# Patient Record
Sex: MALE | Race: WHITE | NOT HISPANIC OR LATINO | Employment: FULL TIME | URBAN - METROPOLITAN AREA
[De-identification: names, ages, dates, MRNs, and addresses within clinical notes are randomized per-mention and may not be internally consistent; named-entity substitution may affect disease eponyms.]

---

## 2017-05-05 RX ORDER — SILDENAFIL 100 MG/1
100 TABLET, FILM COATED ORAL PRN
Qty: 12 TABLET | Refills: 3 | Status: SHIPPED | OUTPATIENT
Start: 2017-05-05 | End: 2018-01-08 | Stop reason: SDUPTHER

## 2017-05-05 RX ORDER — ALLOPURINOL 100 MG/1
100 TABLET ORAL DAILY
Qty: 90 TABLET | Refills: 1 | Status: SHIPPED | OUTPATIENT
Start: 2017-05-05 | End: 2017-12-26 | Stop reason: SDUPTHER

## 2017-08-17 DIAGNOSIS — F41.9 ANXIETY: ICD-10-CM

## 2017-08-17 DIAGNOSIS — F51.01 PRIMARY INSOMNIA: ICD-10-CM

## 2017-08-17 RX ORDER — ALPRAZOLAM 0.5 MG/1
0.5 TABLET ORAL 3 TIMES DAILY PRN
Qty: 45 TABLET | Refills: 1 | Status: SHIPPED
Start: 2017-08-17 | End: 2018-01-08 | Stop reason: SDUPTHER

## 2017-08-17 RX ORDER — ZOLPIDEM TARTRATE 10 MG/1
10 TABLET ORAL NIGHTLY PRN
Qty: 30 TABLET | Refills: 1 | Status: SHIPPED
Start: 2017-08-17 | End: 2018-01-08 | Stop reason: SDUPTHER

## 2017-12-07 RX ORDER — ALLOPURINOL 100 MG/1
100 TABLET ORAL DAILY
Qty: 90 TABLET | Refills: 0 | OUTPATIENT
Start: 2017-12-07

## 2017-12-26 ENCOUNTER — TELEPHONE (OUTPATIENT)
Dept: FAMILY MEDICINE | Age: 37
End: 2017-12-26

## 2017-12-26 RX ORDER — ALLOPURINOL 100 MG/1
100 TABLET ORAL DAILY
Qty: 30 TABLET | Refills: 0 | Status: SHIPPED | OUTPATIENT
Start: 2017-12-26 | End: 2018-01-10 | Stop reason: DRUGHIGH

## 2018-01-08 ENCOUNTER — OFFICE VISIT (OUTPATIENT)
Dept: FAMILY MEDICINE | Age: 38
End: 2018-01-08

## 2018-01-08 ENCOUNTER — LAB SERVICES (OUTPATIENT)
Dept: LAB | Age: 38
End: 2018-01-08

## 2018-01-08 DIAGNOSIS — M1A.0790 IDIOPATHIC CHRONIC GOUT OF ANKLE WITHOUT TOPHUS, UNSPECIFIED LATERALITY: Primary | ICD-10-CM

## 2018-01-08 DIAGNOSIS — F51.01 PRIMARY INSOMNIA: ICD-10-CM

## 2018-01-08 DIAGNOSIS — I10 ESSENTIAL (PRIMARY) HYPERTENSION: ICD-10-CM

## 2018-01-08 DIAGNOSIS — N52.9 ERECTILE DYSFUNCTION, UNSPECIFIED ERECTILE DYSFUNCTION TYPE: ICD-10-CM

## 2018-01-08 DIAGNOSIS — M1A.0790 IDIOPATHIC CHRONIC GOUT OF ANKLE WITHOUT TOPHUS, UNSPECIFIED LATERALITY: ICD-10-CM

## 2018-01-08 DIAGNOSIS — F41.9 ANXIETY: ICD-10-CM

## 2018-01-08 PROCEDURE — 36415 COLL VENOUS BLD VENIPUNCTURE: CPT | Performed by: INTERNAL MEDICINE

## 2018-01-08 PROCEDURE — 84550 ASSAY OF BLOOD/URIC ACID: CPT | Performed by: INTERNAL MEDICINE

## 2018-01-08 PROCEDURE — 80048 BASIC METABOLIC PNL TOTAL CA: CPT | Performed by: INTERNAL MEDICINE

## 2018-01-08 PROCEDURE — 99214 OFFICE O/P EST MOD 30 MIN: CPT | Performed by: FAMILY MEDICINE

## 2018-01-08 RX ORDER — ZOLPIDEM TARTRATE 10 MG/1
10 TABLET ORAL NIGHTLY PRN
Qty: 30 TABLET | Refills: 1 | Status: SHIPPED | OUTPATIENT
Start: 2018-01-08 | End: 2018-07-20 | Stop reason: SDUPTHER

## 2018-01-08 RX ORDER — ALPRAZOLAM 0.5 MG/1
0.5 TABLET ORAL 3 TIMES DAILY PRN
Qty: 45 TABLET | Refills: 1 | Status: SHIPPED | OUTPATIENT
Start: 2018-01-08 | End: 2018-07-20 | Stop reason: SDUPTHER

## 2018-01-08 RX ORDER — SILDENAFIL 100 MG/1
100 TABLET, FILM COATED ORAL PRN
Qty: 12 TABLET | Refills: 3 | Status: SHIPPED | OUTPATIENT
Start: 2018-01-08 | End: 2018-01-23 | Stop reason: SDUPTHER

## 2018-01-08 ASSESSMENT — PATIENT HEALTH QUESTIONNAIRE - PHQ9
CLINICAL INTERPRETATION OF PHQ2 SCORE: 2
SUM OF ALL RESPONSES TO PHQ9 QUESTIONS 1 AND 2: 2

## 2018-01-08 ASSESSMENT — PAIN SCALES - GENERAL: PAINLEVEL: 0

## 2018-01-09 LAB
ANION GAP SERPL CALC-SCNC: 14 MMOL/L (ref 10–20)
BUN SERPL-MCNC: 12 MG/DL (ref 6–20)
BUN/CREAT SERPL: 13 (ref 7–25)
CALCIUM SERPL-MCNC: 9.9 MG/DL (ref 8.4–10.2)
CHLORIDE SERPL-SCNC: 102 MMOL/L (ref 98–107)
CO2 SERPL-SCNC: 25 MMOL/L (ref 21–32)
CREAT SERPL-MCNC: 0.94 MG/DL (ref 0.67–1.17)
FASTING STATUS PATIENT QL REPORTED: 0 HRS
GLUCOSE SERPL-MCNC: 85 MG/DL (ref 65–99)
POTASSIUM SERPL-SCNC: 4 MMOL/L (ref 3.4–5.1)
SODIUM SERPL-SCNC: 137 MMOL/L (ref 135–145)
URATE SERPL-MCNC: 7.7 MG/DL (ref 3.5–7.2)

## 2018-01-10 ENCOUNTER — E-ADVICE (OUTPATIENT)
Dept: FAMILY MEDICINE | Age: 38
End: 2018-01-10

## 2018-01-10 RX ORDER — ALLOPURINOL 300 MG/1
300 TABLET ORAL DAILY
Qty: 90 TABLET | Refills: 3 | Status: SHIPPED | OUTPATIENT
Start: 2018-01-10 | End: 2018-07-20 | Stop reason: SDUPTHER

## 2018-01-13 DIAGNOSIS — N52.9 ERECTILE DYSFUNCTION, UNSPECIFIED ERECTILE DYSFUNCTION TYPE: ICD-10-CM

## 2018-01-15 RX ORDER — SILDENAFIL 100 MG/1
100 TABLET, FILM COATED ORAL PRN
Qty: 12 TABLET | Refills: 3 | OUTPATIENT
Start: 2018-01-15

## 2018-01-23 DIAGNOSIS — N52.9 ERECTILE DYSFUNCTION, UNSPECIFIED ERECTILE DYSFUNCTION TYPE: ICD-10-CM

## 2018-01-23 RX ORDER — SILDENAFIL CITRATE 20 MG/1
TABLET ORAL
Qty: 60 TABLET | Refills: 0 | Status: SHIPPED | OUTPATIENT
Start: 2018-01-23

## 2018-01-23 RX ORDER — SILDENAFIL 100 MG/1
100 TABLET, FILM COATED ORAL PRN
Qty: 12 TABLET | Refills: 3 | Status: CANCELLED | OUTPATIENT
Start: 2018-01-23

## 2018-01-25 ENCOUNTER — TELEPHONE (OUTPATIENT)
Dept: FAMILY MEDICINE | Age: 38
End: 2018-01-25

## 2018-02-02 ENCOUNTER — OFFICE VISIT (OUTPATIENT)
Dept: FAMILY MEDICINE | Age: 38
End: 2018-02-02

## 2018-02-02 VITALS
SYSTOLIC BLOOD PRESSURE: 143 MMHG | HEIGHT: 76 IN | DIASTOLIC BLOOD PRESSURE: 91 MMHG | WEIGHT: 214.6 LBS | RESPIRATION RATE: 20 BRPM | HEART RATE: 88 BPM | TEMPERATURE: 98.5 F | BODY MASS INDEX: 26.13 KG/M2

## 2018-02-02 DIAGNOSIS — R51.9 NONINTRACTABLE HEADACHE, UNSPECIFIED CHRONICITY PATTERN, UNSPECIFIED HEADACHE TYPE: Primary | ICD-10-CM

## 2018-02-02 DIAGNOSIS — Z23 NEED FOR VACCINATION: ICD-10-CM

## 2018-02-02 DIAGNOSIS — I10 ESSENTIAL (PRIMARY) HYPERTENSION: ICD-10-CM

## 2018-02-02 PROCEDURE — 90471 IMMUNIZATION ADMIN: CPT | Performed by: FAMILY MEDICINE

## 2018-02-02 PROCEDURE — 99214 OFFICE O/P EST MOD 30 MIN: CPT | Performed by: FAMILY MEDICINE

## 2018-02-02 PROCEDURE — 90688 IIV4 VACCINE SPLT 0.5 ML IM: CPT | Performed by: FAMILY MEDICINE

## 2018-02-02 RX ORDER — ENALAPRIL MALEATE 10 MG/1
10 TABLET ORAL DAILY
Qty: 90 TABLET | Refills: 0 | Status: SHIPPED | OUTPATIENT
Start: 2018-02-02 | End: 2018-05-15 | Stop reason: DRUGHIGH

## 2018-03-02 ENCOUNTER — OFFICE VISIT (OUTPATIENT)
Dept: FAMILY MEDICINE | Age: 38
End: 2018-03-02

## 2018-03-02 VITALS
BODY MASS INDEX: 25.69 KG/M2 | SYSTOLIC BLOOD PRESSURE: 132 MMHG | HEART RATE: 88 BPM | WEIGHT: 211 LBS | DIASTOLIC BLOOD PRESSURE: 86 MMHG | RESPIRATION RATE: 16 BRPM | HEIGHT: 76 IN | TEMPERATURE: 97.6 F

## 2018-03-02 DIAGNOSIS — I10 ESSENTIAL (PRIMARY) HYPERTENSION: Primary | ICD-10-CM

## 2018-03-02 PROCEDURE — 99214 OFFICE O/P EST MOD 30 MIN: CPT | Performed by: FAMILY MEDICINE

## 2018-05-15 ENCOUNTER — NURSE ONLY (OUTPATIENT)
Dept: FAMILY MEDICINE | Age: 38
End: 2018-05-15

## 2018-05-15 VITALS — DIASTOLIC BLOOD PRESSURE: 102 MMHG | SYSTOLIC BLOOD PRESSURE: 138 MMHG

## 2018-05-15 RX ORDER — ENALAPRIL MALEATE 20 MG/1
20 TABLET ORAL DAILY
Qty: 90 TABLET | Refills: 0 | Status: SHIPPED | OUTPATIENT
Start: 2018-05-15

## 2018-05-16 ENCOUNTER — TELEPHONE (OUTPATIENT)
Dept: FAMILY MEDICINE | Age: 38
End: 2018-05-16

## 2018-07-20 DIAGNOSIS — F51.01 PRIMARY INSOMNIA: ICD-10-CM

## 2018-07-20 DIAGNOSIS — F41.9 ANXIETY: ICD-10-CM

## 2018-07-20 RX ORDER — ALPRAZOLAM 0.5 MG/1
0.5 TABLET ORAL 3 TIMES DAILY PRN
Qty: 45 TABLET | Refills: 1 | Status: SHIPPED | OUTPATIENT
Start: 2018-07-20

## 2018-07-20 RX ORDER — ZOLPIDEM TARTRATE 10 MG/1
10 TABLET ORAL NIGHTLY PRN
Qty: 30 TABLET | Refills: 1 | Status: SHIPPED | OUTPATIENT
Start: 2018-07-20

## 2018-07-20 RX ORDER — ALLOPURINOL 300 MG/1
300 TABLET ORAL DAILY
Qty: 90 TABLET | Refills: 3 | Status: SHIPPED | OUTPATIENT
Start: 2018-07-20

## 2021-04-07 ENCOUNTER — TRANSCRIBE ORDERS (OUTPATIENT)
Dept: OTHER | Age: 41
End: 2021-04-07

## 2021-04-07 DIAGNOSIS — Z86.16 HISTORY OF 2019 NOVEL CORONAVIRUS DISEASE (COVID-19): Primary | ICD-10-CM

## 2021-05-05 ENCOUNTER — VIRTUAL VISIT (OUTPATIENT)
Dept: PHYSICAL MEDICINE AND REHAB | Facility: CLINIC | Age: 41
End: 2021-05-05
Attending: FAMILY MEDICINE
Payer: COMMERCIAL

## 2021-05-05 DIAGNOSIS — R20.2 PARESTHESIA: ICD-10-CM

## 2021-05-05 DIAGNOSIS — G93.31 POSTVIRAL FATIGUE SYNDROME: ICD-10-CM

## 2021-05-05 DIAGNOSIS — U07.1 2019 NOVEL CORONAVIRUS DISEASE (COVID-19): Primary | ICD-10-CM

## 2021-05-05 DIAGNOSIS — N52.9 ERECTILE DYSFUNCTION, UNSPECIFIED ERECTILE DYSFUNCTION TYPE: ICD-10-CM

## 2021-05-05 DIAGNOSIS — R68.89 EXERCISE INTOLERANCE: ICD-10-CM

## 2021-05-05 PROCEDURE — 99204 OFFICE O/P NEW MOD 45 MIN: CPT | Mod: 95 | Performed by: PHYSICAL MEDICINE & REHABILITATION

## 2021-05-05 RX ORDER — ALLOPURINOL 300 MG/1
300 TABLET ORAL DAILY
COMMUNITY
Start: 2019-11-02

## 2021-05-05 RX ORDER — ENALAPRIL MALEATE 20 MG/1
40 TABLET ORAL DAILY
COMMUNITY
Start: 2019-11-02

## 2021-05-05 RX ORDER — HYDROCHLOROTHIAZIDE 25 MG/1
25 TABLET ORAL DAILY
COMMUNITY

## 2021-05-05 RX ORDER — ATENOLOL AND CHLORTHALIDONE TABLET 50; 25 MG/1; MG/1
1 TABLET ORAL DAILY
COMMUNITY
End: 2021-09-23

## 2021-05-05 RX ORDER — ALPRAZOLAM 0.5 MG
0.5 TABLET ORAL PRN
COMMUNITY
Start: 2019-11-01

## 2021-05-05 ASSESSMENT — ANXIETY QUESTIONNAIRES
2. NOT BEING ABLE TO STOP OR CONTROL WORRYING: SEVERAL DAYS
5. BEING SO RESTLESS THAT IT IS HARD TO SIT STILL: NOT AT ALL
1. FEELING NERVOUS, ANXIOUS, OR ON EDGE: SEVERAL DAYS
GAD7 TOTAL SCORE: 4
6. BECOMING EASILY ANNOYED OR IRRITABLE: SEVERAL DAYS
GAD7 TOTAL SCORE: 4
4. TROUBLE RELAXING: NOT AT ALL
7. FEELING AFRAID AS IF SOMETHING AWFUL MIGHT HAPPEN: NOT AT ALL
3. WORRYING TOO MUCH ABOUT DIFFERENT THINGS: SEVERAL DAYS
GAD7 TOTAL SCORE: 4
7. FEELING AFRAID AS IF SOMETHING AWFUL MIGHT HAPPEN: NOT AT ALL

## 2021-05-05 ASSESSMENT — ENCOUNTER SYMPTOMS
HYPERTENSION: 0
DECREASED APPETITE: 0
EXERCISE INTOLERANCE: 1
MEMORY LOSS: 0
MYALGIAS: 1
DISTURBANCES IN COORDINATION: 0
ORTHOPNEA: 0
SEIZURES: 0
NECK PAIN: 0
LOSS OF CONSCIOUSNESS: 0
CHILLS: 0
DEPRESSION: 0
SPEECH CHANGE: 0
PANIC: 0
POLYPHAGIA: 0
NERVOUS/ANXIOUS: 1
DECREASED CONCENTRATION: 0
NUMBNESS: 1
DIZZINESS: 0
BRUISES/BLEEDS EASILY: 0
WEIGHT GAIN: 0
BACK PAIN: 0
HYPOTENSION: 0
LEG PAIN: 0
TINGLING: 1
SLEEP DISTURBANCES DUE TO BREATHING: 0
SYNCOPE: 0
WEIGHT LOSS: 0
NIGHT SWEATS: 0
JOINT SWELLING: 1
TREMORS: 0
SWOLLEN GLANDS: 0
PALPITATIONS: 0
INSOMNIA: 0
MUSCLE CRAMPS: 0
ARTHRALGIAS: 1
LIGHT-HEADEDNESS: 0
POLYDIPSIA: 0
FATIGUE: 1
HEADACHES: 0
MUSCLE WEAKNESS: 0
WEAKNESS: 0
STIFFNESS: 0
INCREASED ENERGY: 0
FEVER: 0
PARALYSIS: 0
HALLUCINATIONS: 0
ALTERED TEMPERATURE REGULATION: 0

## 2021-05-05 ASSESSMENT — PATIENT HEALTH QUESTIONNAIRE - PHQ9
SUM OF ALL RESPONSES TO PHQ QUESTIONS 1-9: 8
10. IF YOU CHECKED OFF ANY PROBLEMS, HOW DIFFICULT HAVE THESE PROBLEMS MADE IT FOR YOU TO DO YOUR WORK, TAKE CARE OF THINGS AT HOME, OR GET ALONG WITH OTHER PEOPLE: SOMEWHAT DIFFICULT
SUM OF ALL RESPONSES TO PHQ QUESTIONS 1-9: 8

## 2021-05-05 NOTE — PROGRESS NOTES
Domingo is a 41 year old who is being evaluated via a billable video visit.      How would you like to obtain your AVS? Mail  If the video visit is dropped, the invitation should be resent by: [phone]      Video Start Time: 9:18am  Video-Visit Details    Type of service:  Video Visit  24 minutes    Video End Time:9:42am    Originating Location (pt. Location): Home    Distant Location (provider location):  [facility] PHYSICAL MEDICINE AND REHABILITATION CLINIC [place]     Platform used for Video Visit: UpNext           PM&R Clinic Note     Patient Name: Domingo Mccann : 1980 Medical Record: [id number]     Requesting Physician/clinician: Ghassan Pierce           History of Present Illness:     Domingo Mccann is a 41 year old male referred to the Adult Post-Covid clinic by Dr. Pierce for long-term symptoms after COVID-19 illess. Initial symptoms included body aches, fever, chills, fatigue, and problems with concentration.  He endorses mild depressive symptoms and mild anxiety today, but does not want to see anyone for these symptoms or for the concentration issues.  He does take Xanax about 1 time per month for anxiety. He also reports new bilateral foot pain, numbness, and tingling.   He has a history of sciatica prior to COVID with intermittent bilateral pain that radiated to the thighs.  The foot pain/paresthesias are new.  He does report morning stiffness/back pain but without radiation. His current symptoms include reduced exercise tolerance, fatigue, worsening of erectile dysfunction, and the bilateral foot pain/paresthesias.  Prior to COVID he was running 5-6 miles per day 3-4 days a week.  He is still able to exercise this frequently, but is only able to run 2 miles per session.  He has no cough or SOB.  He is able to work but reports fatigue that is impacting his mood.  He has not done any PT or OT.    10/2020, body aches, fever, chills, tired.  Not hospitalized.  Current:    Initial Diagnosis:  10/2020, wife became ill 1 week later  Hospitalized?  No  Steroids:  None  Initial Symptoms:  body aches, fever, chills, tired  Current Symptoms: reduced exercise tolerance, fatigue,worsening of erectile dysfunction, bilateral foot pain/paresthesias.    PT/OT/Pulmonary Rehab: None  Neuropsych?  None  Vaccination?  Completed 2 shots, 3/24/21, 4/21/21  Treatment for Mood/Anxiety:   Working with therapist now, she feels like this is helping     PHQ 5/5/2021   PHQ-9 Total Score 8   Q9: Thoughts of better off dead/self-harm past 2 weeks Not at all     JOSE F-7 SCORE 5/5/2021   Total Score 4 (minimal anxiety)   Total Score 4            Past Medical and Surgical History:   COVID-19 illness 10/20  Sleep apnea with Inspire Implant  Erectile Dysfunction, medication less effective since COVID, open to referral  HTN  Sciatica (bilateral radiating thigh pain)         Social History:     Social History     Tobacco Use     Smoking status: Not on file   Substance Use Topics     Alcohol use: Not on file       Marital Status: , no children  Living situation: Lives at home with wife  Family support: Parents live in area for 1/2 year  Vocational History: Investigations for [employer]  Tobacco use: Never  Alcohol use: couple of beers on the weekend  Recreational drug use: none         Functional history:     ADLs: Independent  Assistive devices: none  iADLs (medication management and finances): Independent    Pre-COVID physical activity level:  Works out 3-4 days a week and is still able to do this.  He used to run 5-6 miles, now is difficult to get in 2 miles per run.  He also continues to do other exercises. No cough or shortness of breath.         Family History:     No family history on file.         Medications:     Current Outpatient Medications   Medication Sig Dispense Refill     allopurinol (ZYLOPRIM) 300 MG tablet Take 300 mg by mouth daily       ALPRAZolam (XANAX) 0.5 MG tablet Take  0.5 mg by mouth as needed       atenolol-chlorthalidone (TENORETIC) 50-25 MG tablet Take 1 tablet by mouth daily       enalapril (VASOTEC) 20 MG tablet Take 40 mg by mouth daily       hydrochlorothiazide (HYDRODIURIL) 25 MG tablet Take 25 mg by mouth daily              Allergies:     No Known Allergies           ROS:     Answers for HPI/ROS submitted by the patient on 5/5/2021   If you checked off any problems, how difficult have these problems made it for you to do your work, take care of things at home, or get along with other people?: Somewhat difficult  PHQ9 TOTAL SCORE: 8  JOSE F 7 TOTAL SCORE: 4  General Symptoms: Yes  Skin Symptoms: No  HENT Symptoms: No  EYE SYMPTOMS: No  HEART SYMPTOMS: Yes  LUNG SYMPTOMS: No  INTESTINAL SYMPTOMS: No  URINARY SYMPTOMS: No  REPRODUCTIVE SYMPTOMS: Yes  SKELETAL SYMPTOMS: Yes  BLOOD SYMPTOMS: Yes  NERVOUS SYSTEM SYMPTOMS: Yes  MENTAL HEALTH SYMPTOMS: Yes  Fever: No  Loss of appetite: No  Weight loss: No  Weight gain: No  Fatigue: Yes  Night sweats: No  Chills: No  Increased stress: Yes  Excessive hunger: No  Excessive thirst: No  Feeling hot or cold when others believe the temperature is normal: No  Loss of height: No  Post-operative complications: No  Surgical site pain: No  Hallucinations: No  Change in or Loss of Energy: No  Hyperactivity: No  Confusion: Yes  Chest pain or pressure: No  Fast or irregular heartbeat: No  Pain in legs with walking: No  Trouble breathing while lying down: No  Fingers or toes appear blue: No  High blood pressure: No  Low blood pressure: No  Fainting: No  Murmurs: No  Pacemaker: No  Varicose veins: No  Edema or swelling: Yes  Wake up at night with shortness of breath: No  Light-headedness: No  Exercise intolerance: Yes  Back pain: No  Muscle aches: Yes  Neck pain: No  Swollen joints: Yes  Joint pain: Yes  Bone pain: No  Muscle cramps: No  Muscle weakness: No  Joint stiffness: No  Bone fracture: No  Anemia: No  Swollen glands: No  Easy bleeding or  bruising: No  Trouble with coordination: No  Dizziness or trouble with balance: No  Fainting or black-out spells: No  Memory loss: No  Headache: No  Seizures: No  Speech problems: No  Tingling: Yes  Tremor: No  Weakness: No  Difficulty walking: Yes  Paralysis: No  Numbness: Yes  Scrotal pain or swelling: No  Erectile dysfunction: Yes  Penile discharge: No  Genital ulcers: No  Reduced libido: No  Nervous or Anxious: Yes  Depression: No  Trouble sleeping: No  Trouble thinking or concentrating: No  Mood changes: Yes  Panic attacks: No             Physical Examiniation:     VITAL SIGNS: There were no vitals taken for this visit.  BMI: There is no height or weight on file to calculate BMI.    Gen: NAD, pleasant and cooperative            Laboratory/Imaging:     No recent results available for review           Assessment/Plan:     This is a 40 YO male with long-term symptoms related to COVID infection (10/6/2020) who presents today for assessment of his neurological symptoms, fatigue, exercise intolerance, and worsening ED.  I have recommended PT, OT (fatigue reduction), Urology referral for ED, and PMR ([facility]) for work-up of neuropathy vs radiculopathy.    1. Patient education: In depth discussion and education was provided about the assessment and implications of each of the below recommendations for management. Patient indicated readiness to learn, all questions were answered and understanding of material presented was confirmed.    2. Work-up:  PMR referral ([facility]) for work up of neuropathy vs radiculopathy    3. Therapy/equipment/braces:  PT for reduced exercise tolerance, OT for fatigue reduction program    4. Medications:  None at this time    5. Interventions: None at this time    6. Referral / follow up with other providers:  Urology for ED work-up, PMR ([facility]) for work up of neuropathy vs radiculopathy.    7. Follow up: 6 weeks if symptoms persist, sooner if needed    Ariane Manjarrez, DO  Physical Medicine &  Rehabilitation    I spent a total of 24 minutes  with Domingo Mccann during today's virtual video visit. Over 50% of this time was spent counseling the patient and/or coordinating care. See note for details.     21 minutes spent on the date of the encounter doing chart review, history and exam, documentation and further activities as noted above

## 2021-05-06 ASSESSMENT — PATIENT HEALTH QUESTIONNAIRE - PHQ9: SUM OF ALL RESPONSES TO PHQ QUESTIONS 1-9: 8

## 2021-05-06 ASSESSMENT — ANXIETY QUESTIONNAIRES: GAD7 TOTAL SCORE: 4

## 2021-05-24 VITALS
RESPIRATION RATE: 16 BRPM | WEIGHT: 215 LBS | BODY MASS INDEX: 26.73 KG/M2 | HEART RATE: 70 BPM | SYSTOLIC BLOOD PRESSURE: 142 MMHG | DIASTOLIC BLOOD PRESSURE: 96 MMHG | TEMPERATURE: 97.7 F | HEIGHT: 75 IN

## 2021-06-08 ENCOUNTER — VIRTUAL VISIT (OUTPATIENT)
Dept: UROLOGY | Facility: CLINIC | Age: 41
End: 2021-06-08
Attending: PHYSICAL MEDICINE & REHABILITATION
Payer: COMMERCIAL

## 2021-06-08 DIAGNOSIS — R68.82 LOW LIBIDO: Primary | ICD-10-CM

## 2021-06-08 DIAGNOSIS — Z80.42 FAMILY HISTORY OF PROSTATE CANCER IN FATHER: ICD-10-CM

## 2021-06-08 DIAGNOSIS — N52.9 ERECTILE DYSFUNCTION, UNSPECIFIED ERECTILE DYSFUNCTION TYPE: ICD-10-CM

## 2021-06-08 PROCEDURE — 99204 OFFICE O/P NEW MOD 45 MIN: CPT | Mod: 95 | Performed by: UROLOGY

## 2021-06-08 RX ORDER — TADALAFIL 5 MG/1
5 TABLET ORAL EVERY 24 HOURS
Qty: 30 TABLET | Refills: 11 | Status: SHIPPED | OUTPATIENT
Start: 2021-06-08 | End: 2022-05-24

## 2021-06-08 NOTE — PROGRESS NOTES
Urology Consult History and Physical  FLORA SIMON   Name: Domingo Mccann    MRN: [id number]   YOB: 1980       We were asked to see Domingo Mccann at the request of Dr. Manjarrez for evaluation and treatment of Erectile dysfunction .        Chief Complaint:   Erectile dysfunction     History is obtained from the patient            History of Present Illness:   Domingo Mccann is a 41 year old male who is being seen for evaluation of Erectile dysfunction     He recently had COVID-19 7-8 months ago and has been having post COVID issues  Since COVID his sex drive has been decreased  He had been using sildenafil 20mg tablets and he had been using 40mg  If he tried 60mg then he has bothersome side effects  He has been on sildenafil for the past 5 years    Father did have prostate cancer diagnosed around age 73 and underwent radiation therapy             Past Medical History:   No past medical history on file.         Past Surgical History:   No past surgical history on file.         Social History:     Social History     Tobacco Use     Smoking status: Not on file   Substance Use Topics     Alcohol use: Not on file       History   Smoking Status     Not on file   Smokeless Tobacco     Not on file            Family History:   No family history on file.           Allergies:   No Known Allergies         Medications:     Current Outpatient Medications   Medication Sig     allopurinol (ZYLOPRIM) 300 MG tablet Take 300 mg by mouth daily     ALPRAZolam (XANAX) 0.5 MG tablet Take 0.5 mg by mouth as needed     atenolol-chlorthalidone (TENORETIC) 50-25 MG tablet Take 1 tablet by mouth daily     enalapril (VASOTEC) 20 MG tablet Take 40 mg by mouth daily     hydrochlorothiazide (HYDRODIURIL) 25 MG tablet Take 25 mg by mouth daily     No current facility-administered medications for this visit.              Review of Systems:     Skin: negative  Eyes: negative  Ears/Nose/Throat: negative  Respiratory: No shortness of  breath, dyspnea on exertion, cough, or hemoptysis  Cardiovascular: negative  Gastrointestinal: negative  Genitourinary: as above  Musculoskeletal: negative  Neurologic: negative  Psychiatric: negative  Hematologic/Lymphatic/Immunologic: negative  Endocrine: as above          Physical Exam:   PHYSICAL EXAM  Patient is a 41 year old  male   Vitals: There were no vitals taken for this visit.  There is no height or weight on file to calculate BMI.  General Appearance Adult:   Alert, no acute distress, oriented  HENT: throat/mouth:normal, good dentition  Lungs: no respiratory distress, or pursed lip breathing  Heart: No obvious jugular venous distension present  Abdomen: non - distended  Musculoskeltal: extremities normal, no peripheral edema  Skin: no suspicious lesions or rashes  Neuro: Alert, oriented, speech and mentation normal  Psych: affect and mood normal  Gait: Normal           Data:   All laboratory data reviewed:         Impression and Plan:   Impression:   41-year-old man with long history of erectile dysfunction now with worsening symptoms and decreased libido following COVID-19 about 6 or 7 months ago, also with a family history of prostate cancer      Plan:   Erectile dysfunction and worsening libido following COVID-19  -We discussed the pathophysiology of erectile dysfunction  -I am unaware of COVID-19 impacting libido and the efficacy of PDE 5 inhibitors, however we discussed that there is much about COVID-19 that is unknown  -We discussed the need for a early morning testosterone check to assess his testosterone level  -We discussed various PDE 5 inhibitors and will give a trial of Cialis 5 mg daily and prescription sent to the pharmacy  -We discussed that if his testosterone level is low, the next up would be a recheck testosterone with an LH level as well prior to initiation testosterone replacement    Family history of prostate cancer  -His father had prostate cancer and underwent radiation therapy  in his early 70s  -We discussed that PSA checked between the age of 40 and 55 depends on risk factors and given his family history I would recommend a PSA check especially if we are considering testosterone replacement  -Order for PSA placed    Timing of follow-up pending his lab results     Thank you for the kind consultation.    Chart documentation with Dragon Voice recognition Software. Although reviewed after completion, some words and grammatical errors may remain.     Time spent: 20 minutes spent on the date of the encounter doing chart review, history and exam, documentation and further activities as noted above.    Kings Carrillo MD   Urology  [facility]  [facility] Phone: [phone]  [facility] Phone: [phone]       Domingo Mccann  who is being evaluated via a billable video visit.      How would you like to obtain your AVS? Mail a copy  If the video visit is dropped, the invitation should be resent by: Text to cell phone: [phone]  Will anyone else be joining your video visit? No    Video-Visit Details    Type of service:  Video Visit    Video Start Time: 2:45 PM    Video End Time:3:01 PM    Originating Location (pt. Location): Home    Distant Location (provider location):  [facility]     Platform used for Video Visit: [employer]

## 2021-06-09 ENCOUNTER — TELEPHONE (OUTPATIENT)
Dept: UROLOGY | Facility: CLINIC | Age: 41
End: 2021-06-09

## 2021-06-09 NOTE — TELEPHONE ENCOUNTER
1st attempt, called patient and left message to schedule as noted below.    Per the last visit with Dr Carrillo patient should schedule the following appointments:      an early morning lab visit for testosterone and PSA    Call center please assist patient with scheduling these appointments.      Annette Lizama  Surgical Specialties Procedure   [employer] [place]  6/9/2021 10:21 AM

## 2021-06-11 DIAGNOSIS — R68.82 LOW LIBIDO: ICD-10-CM

## 2021-06-11 LAB — PSA SERPL-ACNC: 3.13 UG/L (ref 0–4)

## 2021-06-11 PROCEDURE — 84270 ASSAY OF SEX HORMONE GLOBUL: CPT | Performed by: UROLOGY

## 2021-06-11 PROCEDURE — 84403 ASSAY OF TOTAL TESTOSTERONE: CPT | Mod: 90 | Performed by: UROLOGY

## 2021-06-11 PROCEDURE — G0103 PSA SCREENING: HCPCS | Performed by: UROLOGY

## 2021-06-11 PROCEDURE — 99000 SPECIMEN HANDLING OFFICE-LAB: CPT | Performed by: UROLOGY

## 2021-06-11 PROCEDURE — 36415 COLL VENOUS BLD VENIPUNCTURE: CPT | Performed by: UROLOGY

## 2021-06-15 LAB
SHBG SERPL-SCNC: 12 NMOL/L (ref 11–80)
TESTOST FREE SERPL-MCNC: 6.24 NG/DL (ref 4.7–24.4)
TESTOST SERPL-MCNC: 208 NG/DL (ref 240–950)

## 2021-06-16 ENCOUNTER — TELEPHONE (OUTPATIENT)
Dept: UROLOGY | Facility: CLINIC | Age: 41
End: 2021-06-16

## 2021-06-16 DIAGNOSIS — R68.82 LOW LIBIDO: Primary | ICD-10-CM

## 2021-06-16 NOTE — TELEPHONE ENCOUNTER
[facility]    Phone Message    May a detailed message be left on voicemail: yes     Reason for Call: Requesting Results   Name/type of test: Testosterone  Date of test: 6/11/21  Was test done at a location other than [facility] (Please fill in the location if not [facility])?: No      Action Taken: Message routed to:  Adult Clinics: Urology p [id number]    Travel Screening: Not Applicable

## 2021-06-17 NOTE — TELEPHONE ENCOUNTER
Kings Carrillo MD  P [facility] Urology Adult [place]             Please contact the patient to inform them of their testosterone results - however this was drawn at 12:28PM instead of 8-9AM, so these are unreliable results.     Plan   - He needs to repeat a testosterone as a EARLY MORNING lab drawn, 7-9 AM.     Thanks,   Kings GRIFFIN. MD [place]      Received the 6/11/21 testosterone results and the above result note from Dr. Carrillo. Called and spoke to patient who is aware of the above information. Patient verbalized understanding. Lab only appointment scheduled for 6/22/21 at 8:40am.    Bess Simmons RN, BSN

## 2021-06-22 ENCOUNTER — VIRTUAL VISIT (OUTPATIENT)
Dept: PHYSICAL MEDICINE AND REHAB | Facility: CLINIC | Age: 41
End: 2021-06-22
Attending: PHYSICAL MEDICINE & REHABILITATION
Payer: COMMERCIAL

## 2021-06-22 DIAGNOSIS — G62.9 NEUROPATHY: ICD-10-CM

## 2021-06-22 DIAGNOSIS — R68.82 LOW LIBIDO: ICD-10-CM

## 2021-06-22 DIAGNOSIS — U07.1 2019 NOVEL CORONAVIRUS DISEASE (COVID-19): Primary | ICD-10-CM

## 2021-06-22 DIAGNOSIS — M54.41 CHRONIC BILATERAL LOW BACK PAIN WITH RIGHT-SIDED SCIATICA: ICD-10-CM

## 2021-06-22 DIAGNOSIS — G89.29 CHRONIC BILATERAL LOW BACK PAIN WITH RIGHT-SIDED SCIATICA: ICD-10-CM

## 2021-06-22 LAB — VIT B12 SERPL-MCNC: 218 PG/ML (ref 193–986)

## 2021-06-22 PROCEDURE — 99215 OFFICE O/P EST HI 40 MIN: CPT | Mod: 95 | Performed by: PHYSICAL MEDICINE & REHABILITATION

## 2021-06-22 PROCEDURE — 84270 ASSAY OF SEX HORMONE GLOBUL: CPT | Performed by: UROLOGY

## 2021-06-22 PROCEDURE — 36415 COLL VENOUS BLD VENIPUNCTURE: CPT | Performed by: UROLOGY

## 2021-06-22 PROCEDURE — 82607 VITAMIN B-12: CPT | Performed by: PATHOLOGY

## 2021-06-22 PROCEDURE — 84403 ASSAY OF TOTAL TESTOSTERONE: CPT | Mod: 90 | Performed by: UROLOGY

## 2021-06-22 PROCEDURE — 99000 SPECIMEN HANDLING OFFICE-LAB: CPT | Performed by: UROLOGY

## 2021-06-22 RX ORDER — GABAPENTIN 300 MG/1
CAPSULE ORAL
Qty: 270 CAPSULE | Refills: 1 | Status: SHIPPED | OUTPATIENT
Start: 2021-06-22 | End: 2021-09-23

## 2021-06-22 NOTE — PROGRESS NOTES
"       PM&R Clinic Note     Patient Name: Domingo Mccann : 1980 Medical Record: [id number]     Requesting Physician/clinician: Ariane Manjarrez DO           History of Present Illness:     Domingo Mccann is a 41 year old male who was seen today for more evaluation of paresthesia in his feet. He was diagnosed with COVID-19 infection in 10/2020 and was seen in post-COVID clinic by Dr. Manjarrez. His residual symptoms include reduced exercise tolerance, fatigue, worsening of erectile dysfunction, and the bilateral foot pain/paresthesias.    Today, he reported chronic h/o right sided low back pain for the past 8 years. No specific injury or inciting event; pain started gradually and has been intermittently persistent since then. It's a dull achy pain located at low back area with occasional radiation to bilateral lower extremities. Radicular pain is located at posterior thigh b/l and stops at the knees. Pain gets worse with prolonged sitting and intense workout. His pain usually improves after 1-23 days by time or by taking advil. No prior h/o paresthesia in feet before his COVID infection. No h/o surgery on his back or bilateral lower extremities. He didn't see PT and never had any injections for his back pain. He saw a chiropractor a few times which was beneficial. He also stopped road running and will run on a trail or treadmill only.     Pain and paresthesia in his feet started after his illness with COVID-19 infection and have been persistent since then. He describes numbness and tingling which are mostly bothersome in the morning. He also has some achy pain and \"tight sensation\" in his feet along with numbness and tingling.  His symptoms involve his toes and the plantar aspect of his feet, and are worse on the right side. His symptoms improve throughout the day.     He has some balance issues secondary to the pain and paresthesia in his feet. No issues with bowel and bladder function. He has h/o BERT and was " using CPAP before. He underwent new Inspire device implantation about a year ago for the management of his BERT. This is managed by neurology team at the [facility]. His BERT has improved but he still has difficulty with his sleep quality and wakes up tired in the morning. He has difficulty with both falling and staying asleep.     He has tried different types of shoes with no specific benefits. No therapies in the past. No h/o similar issues in the past. No FH of neuropathy.              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  He is active and regularly works out; less activity tolerance since his COVID infection.            Family History:     No family history on file.            Medications:     Current Outpatient Medications   Medication Sig Dispense Refill     allopurinol (ZYLOPRIM) 300 MG tablet Take 300 mg by mouth daily       ALPRAZolam (XANAX) 0.5 MG tablet Take 0.5 mg by mouth as needed       atenolol-chlorthalidone (TENORETIC) 50-25 MG tablet Take 1 tablet by mouth daily       enalapril (VASOTEC) 20 MG tablet Take 40 mg by mouth daily       hydrochlorothiazide (HYDRODIURIL) 25 MG tablet Take 25 mg by mouth daily       tadalafil (CIALIS) 5 MG tablet Take 1 tablet (5 mg) by mouth every 24 hours 30 tablet 11            Allergies:     No Known Allergies           ROS:     A focused ROS is negative other than the symptoms noted above in the  HPI.             Physical Examiniation:     Video visit            Laboratory/Imaging:     HgbA1c was 5.5 in 2018           Assessment/Plan:       H/o COVID infection in 10/2020    Residual symptoms include fatigue, limited activity tolerance, pain and paresthesia in feet and ED    H/o BERT with implanted Inspire in place since 8/2020    Discussed possible etiologies for the symptoms in his feet including polyneuropathy, and radiculopathy (less likely) with some components of myofascial pain. Physical exam is needed to narrow down the differentials. Will also need basic blood work screening and NCS/EMG to rule out common causes.      1. Patient education: In depth discussion and education was provided about the assessment and implications of each of the below recommendations for management. Patient indicated readiness to learn, all questions were answered and understanding of material presented was confirmed.    2. Work-up:  --HgbA1C   --B12 level with reflex MMA   --TSH and T4   --Serum protein immunofixation   --NCS/EMG of bilateral lower extremities to evaluate for polyneuropathy and also radic screening for RLE     3. Therapy/equipment/braces: he will start PT and OT soon    4. Medications: started gabapentin with slow titration to minimize risk of side effects    5. Interventions: none today     6. Referral / follow up with other providers: no new referral; will follow up with urology team regarding ED and Dr. Manjarrez regarding other post-COVID symptoms     7. Follow up: in 2-3 months to discuss the results of the above work-up and his response to the medication     Matilda Ellsworth MD  Physical Medicine & Rehabilitation    55 minutes spent on the date of the encounter doing chart review, video visit, documentation and further activities as noted above

## 2021-06-22 NOTE — LETTER
"2021       RE: Domingo Mccann  [address]  [place] [address]     Dear Colleague,    Thank you for referring your patient, Domingo Mccann, to the [facility] PHYSICAL MEDICINE AND REHABILITATION CLINIC [place] at [facility]. Please see a copy of my visit note below.           PM&R Clinic Note     Patient Name: Domingo Mccann : 1980 Medical Record: [id number]     Requesting Physician/clinician: Ariane Manjarrez DO           History of Present Illness:     Domingo Mccann is a 41 year old male who was seen today for more evaluation of paresthesia in his feet. He was diagnosed with COVID-19 infection in 10/2020 and was seen in post-COVID clinic by Dr. Manjarrez. His residual symptoms include reduced exercise tolerance, fatigue, worsening of erectile dysfunction, and the bilateral foot pain/paresthesias.    Today, he reported chronic h/o right sided low back pain for the past 8 years. No specific injury or inciting event; pain started gradually and has been intermittently persistent since then. It's a dull achy pain located at low back area with occasional radiation to bilateral lower extremities. Radicular pain is located at posterior thigh b/l and stops at the knees. Pain gets worse with prolonged sitting and intense workout. His pain usually improves after 1-23 days by time or by taking advil. No prior h/o paresthesia in feet before his COVID infection. No h/o surgery on his back or bilateral lower extremities. He didn't see PT and never had any injections for his back pain. He saw a chiropractor a few times which was beneficial. He also stopped road running and will run on a trail or treadmill only.     Pain and paresthesia in his feet started after his illness with COVID-19 infection and have been persistent since then. He describes numbness and tingling which are mostly bothersome in the morning. He also has some achy pain and \"tight " "sensation\" in his feet along with numbness and tingling.  His symptoms involve his toes and the plantar aspect of his feet, and are worse on the right side. His symptoms improve throughout the day.     He has some balance issues secondary to the pain and paresthesia in his feet. No issues with bowel and bladder function. He has h/o BERT and was using CPAP before. He underwent new Inspire device implantation about a year ago for the management of his BERT. This is managed by neurology team at the [facility]. His BERT has improved but he still has difficulty with his sleep quality and wakes up tired in the morning. He has difficulty with both falling and staying asleep.     He has tried different types of shoes with no specific benefits. No therapies in the past. No h/o similar issues in the past. No FH of neuropathy.              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  He is active and regularly works out; less activity tolerance since his COVID infection.            Family History:     No family history on file.            Medications:     Current Outpatient Medications   Medication Sig Dispense Refill     allopurinol (ZYLOPRIM) 300 MG tablet Take 300 mg by mouth daily       ALPRAZolam (XANAX) 0.5 MG tablet Take 0.5 mg by mouth as needed       atenolol-chlorthalidone (TENORETIC) 50-25 MG tablet Take 1 tablet by mouth daily       enalapril (VASOTEC) 20 MG " tablet Take 40 mg by mouth daily       hydrochlorothiazide (HYDRODIURIL) 25 MG tablet Take 25 mg by mouth daily       tadalafil (CIALIS) 5 MG tablet Take 1 tablet (5 mg) by mouth every 24 hours 30 tablet 11            Allergies:     No Known Allergies           ROS:     A focused ROS is negative other than the symptoms noted above in the HPI.             Physical Examiniation:     Video visit            Laboratory/Imaging:     HgbA1c was 5.5 in 2018           Assessment/Plan:       H/o COVID infection in 10/2020    Residual symptoms include fatigue, limited activity tolerance, pain and paresthesia in feet and ED    H/o BERT with implanted Inspire in place since 8/2020    Discussed possible etiologies for the symptoms in his feet including polyneuropathy, and radiculopathy (less likely) with some components of myofascial pain. Physical exam is needed to narrow down the differentials. Will also need basic blood work screening and NCS/EMG to rule out common causes.      1. Patient education: In depth discussion and education was provided about the assessment and implications of each of the below recommendations for management. Patient indicated readiness to learn, all questions were answered and understanding of material presented was confirmed.    2. Work-up:  --HgbA1C   --B12 level with reflex MMA   --TSH and T4   --Serum protein immunofixation   --NCS/EMG of bilateral lower extremities to evaluate for polyneuropathy and also radic screening for RLE     3. Therapy/equipment/braces: he will start PT and OT soon    4. Medications: started gabapentin with slow titration to minimize risk of side effects    5. Interventions: none today     6. Referral / follow up with other providers: no new referral; will follow up with urology team regarding ED and Dr. Manjarrez regarding other post-COVID symptoms     7. Follow up: in 2-3 months to discuss the results of the above work-up and his response to the medication     Matilda Ellsworth  MD  Physical Medicine & Rehabilitation    55 minutes spent on the date of the encounter doing chart review, video visit, documentation and further activities as noted above                Domingo is a 41 year old who is being evaluated via a billable video visit.      How would you like to obtain your AVS? Mail  If the video visit is dropped, the invitation should be resent by: [phone]      Video Start Time: 11:25 AM  Video-Visit Details    Type of service:  Video Visit    Video End Time:12:10 PM    Originating Location (pt. Location): [place]    Distant Location (provider location):  [facility] PHYSICAL MEDICINE AND REHABILITATION CLINIC [place]     Platform used for Video Visit: doximity        Again, thank you for allowing me to participate in the care of your patient.      Sincerely,    Matilda Ellsworth MD

## 2021-06-22 NOTE — PROGRESS NOTES
Domingo is a 41 year old who is being evaluated via a billable video visit.      How would you like to obtain your AVS? Mail  If the video visit is dropped, the invitation should be resent by: [phone]      Video Start Time: 11:25 AM  Video-Visit Details    Type of service:  Video Visit    Video End Time:12:10 PM    Originating Location (pt. Location): Home    Distant Location (provider location):  [facility] PHYSICAL MEDICINE AND REHABILITATION CLINIC [place]     Platform used for Video Visit: Closetbox

## 2021-06-23 LAB
SHBG SERPL-SCNC: 11 NMOL/L (ref 11–80)
TESTOST FREE SERPL-MCNC: 5.73 NG/DL (ref 4.7–24.4)
TESTOST SERPL-MCNC: 187 NG/DL (ref 240–950)

## 2021-07-05 ENCOUNTER — OFFICE VISIT (OUTPATIENT)
Dept: NEUROLOGY | Facility: CLINIC | Age: 41
End: 2021-07-05
Attending: PHYSICAL MEDICINE & REHABILITATION
Payer: COMMERCIAL

## 2021-07-05 DIAGNOSIS — G62.9 NEUROPATHY: ICD-10-CM

## 2021-07-05 DIAGNOSIS — U07.1 2019 NOVEL CORONAVIRUS DISEASE (COVID-19): ICD-10-CM

## 2021-07-05 DIAGNOSIS — M54.41 CHRONIC BILATERAL LOW BACK PAIN WITH RIGHT-SIDED SCIATICA: ICD-10-CM

## 2021-07-05 DIAGNOSIS — G89.29 CHRONIC BILATERAL LOW BACK PAIN WITH RIGHT-SIDED SCIATICA: ICD-10-CM

## 2021-07-05 PROCEDURE — 95911 NRV CNDJ TEST 9-10 STUDIES: CPT | Performed by: PSYCHIATRY & NEUROLOGY

## 2021-07-05 PROCEDURE — 95886 MUSC TEST DONE W/N TEST COMP: CPT | Performed by: PSYCHIATRY & NEUROLOGY

## 2021-07-05 NOTE — PROGRESS NOTES
[facility]  Electrodiagnostic Laboratory    Nerve Conduction & EMG Report          Patient:       Domingo Mccann  Patient ID:    [id number]  Gender:        Male  YOB: 1980  Age:           41 Years 3 Months        History & Examination:  41 year old man who developed numbness and paresthesias in his foot plantar surface since COVID in the fall . He has pes cavus, which he reports has been chronically present. Evaluate for polyneuropathy.     Techniques: Motor and sensory conduction studies were done with surface recording electrodes. EMG was done with a concentric needle electrode.      Results:  Nerve conduction studies:   1. Right superficial peroneal sensory response is absent.   2. Bilateral sural sensory responses show reduced amplitudes and normal CV.   3. Right median-D2 sensory response is normal.   4. Bilateral peroneal-EDB motor responses show normal DL, normal amplitudes and mild to moderate CV slowing in the foreleg.   5. Right tibial-AH motor response shows normal DL, moderately reduced amplitude and moderately slowed CV.   6. Left tibial-AH motor response shows normal DL, normal amplitude and moderately slowed CV.    7. Right median-APB motor response is normal.     Needle EM. No abnormal spontaneous activity was seen in the sampled muscles.   2. Large amplitude and/or long duration motor unit potentials (MUP) were seen in the right TA and gastrocnemius muscles.   3. Recruitment patterns were normal.     Interpretation:  This is an abnormal study. There is electrophysiologic evidence of a mild moderate severe, axonal, length-dependant axonal, sensorimotor polyneuropathy.       EMG Physician:  This is an abnormal study. There is electrophysiologic evidence of a chronic, somewhat asymmetric, motor > sensory large fiber polyneuropathy affecting the lower limbs characterized by equivocal degrees of conduction velocity slowing in lower limb motor nerves. Although the  "conduction velocity slowing can not be explained by amplitude-dependant slowing, the degree of slowing is of an insufficient severity to classify the neuropathy as \"demyelinating\" with certainty. Clinical correlation is recommended.     Stone Herzog MD  Department of Neurology        Sensory NCS      Nerve / Sites Rec. Site Onset Peak NP Amp Ref. PP Amp Dist Yevgeniy Ref. Temp     ms ms  V  V  V cm m/s m/s  C   R MEDIAN - Dig II Anti      Wrist Dig II 2.92 3.75 18.9 10.0 16.7 14 48.0 48.0 32.1   R SURAL - Lat Mall      Calf Ankle 3.28 4.48 3.1 8.0 3.9 14 42.7 38.0 31   L SURAL - Lat Mall      Calf Ankle 3.02 4.17 3.8 8.0 5.2 14 46.3 38.0    R SUP PERONEAL      Lat Leg Wong NR NR NR  NR 12.5 NR 38.0 31       Motor NCS      Nerve / Sites Rec. Site Lat Ref. Amp Ref. Rel Amp Dist Yevgeniy Ref. Dur. Area Temp.     ms ms mV mV % cm m/s m/s ms %  C   R MEDIAN - APB      Wrist APB 3.59 4.40 12.6 5.0 100 8   6.35 100 32      Elbow APB 8.49  12.1  96.1 26 53.1 48.0 6.61 96.9 32   R DEEP PERONEAL - EDB      Ankle EDB 5.83 6.00 2.8 2.5 100 8   6.82 100 31      FibHead EDB 16.72  2.6  93.3 36 33.1 38.0 9.17 99.2       Pop Fos EDB 19.64  2.5  89.8 11 37.7 38.0 8.75 103    L DEEP PERONEAL - EDB      Ankle EDB 5.78 6.00 3.7 2.5 100 8   6.67 100 31      FibHead EDB 16.77  3.2  85.9 37 33.7 38.0 6.35 78.7 31      Pop Fos EDB 19.01  3.2  84.7 10 44.7 38.0 6.41 113 31   R TIBIAL - AH      Ankle AH 3.80 6.00 1.4 4.0 100 8   6.51 100 31      Pop Fos AH 17.29  1.2  85.2 43 31.9 38.0 8.91 90.9 31   L TIBIAL - AH      Ankle AH 4.48 6.00 4.2 4.0 100 8   7.55 100 31      Pop Fos AH 18.07  2.7  65.2 41 30.2 38.0 9.48 93.6 31   R PERONEAL - Tib Ant      Fib Head Tib Ant 3.39  8.2  100    13.70 100 31      Knee Tib Ant 5.31  8.0  98.6 10 51.9  13.65 100 31       F  Wave      Nerve Min F Lat Max F Lat Mean FLat Temp.    ms ms ms  C   L TIBIAL 69.22 70.94 69.87 31.2   R MEDIAN 31.15 34.06 33.12 32       EMG Summary Table     Spontaneous Recruitment MUAP    " IA Fib PSW Fasc H.F. Pattern Amp Dur. PPP   R. VAST LATERALIS N None None None None N N N N   R. TIB ANTERIOR N None None None None N 2+ N N   R. GASTROCN (MED) N None None None None N 1+ 1+ N   R. GLUTEUS MED N None None None None N N N N   R. LUMB PSP (L) N None None None None

## 2021-07-05 NOTE — LETTER
2021       RE: Domingo Mccann  [address]  [place] [address]     Dear Colleague,    Thank you for referring your patient, Domingo Mccann, to the [facility] EMG CLINIC [place] at [facility]. Please see a copy of my visit note below.        [facility]  Electrodiagnostic Laboratory    Nerve Conduction & EMG Report          Patient:       Domingo Mccann  Patient ID:    [id number]  Gender:        Male  YOB: 1980  Age:           41 Years 3 Months        History & Examination:  41 year old man who developed numbness and paresthesias in his foot plantar surface since COVID in the fall . He has pes cavus, which he reports has been chronically present. Evaluate for polyneuropathy.     Techniques: Motor and sensory conduction studies were done with surface recording electrodes. EMG was done with a concentric needle electrode.      Results:  Nerve conduction studies:   1. Right superficial peroneal sensory response is absent.   2. Bilateral sural sensory responses show reduced amplitudes and normal CV.   3. Right median-D2 sensory response is normal.   4. Bilateral peroneal-EDB motor responses show normal DL, normal amplitudes and mild to moderate CV slowing in the foreleg.   5. Right tibial-AH motor response shows normal DL, moderately reduced amplitude and moderately slowed CV.   6. Left tibial-AH motor response shows normal DL, normal amplitude and moderately slowed CV.    7. Right median-APB motor response is normal.     Needle EM. No abnormal spontaneous activity was seen in the sampled muscles.   2. Large amplitude and/or long duration motor unit potentials (MUP) were seen in the right TA and gastrocnemius muscles.   3. Recruitment patterns were normal.     Interpretation:  This is an abnormal study. There is electrophysiologic evidence of a mild moderate severe, axonal, length-dependant axonal, sensorimotor  "polyneuropathy.       EMG Physician:  This is an abnormal study. There is electrophysiologic evidence of a chronic, somewhat asymmetric, motor > sensory large fiber polyneuropathy affecting the lower limbs characterized by equivocal degrees of conduction velocity slowing in lower limb motor nerves. Although the conduction velocity slowing can not be explained by amplitude-dependant slowing, the degree of slowing is of an insufficient severity to classify the neuropathy as \"demyelinating\" with certainty. Clinical correlation is recommended.     Stone Herzog MD  Department of Neurology        Sensory NCS      Nerve / Sites Rec. Site Onset Peak NP Amp Ref. PP Amp Dist Yevgeniy Ref. Temp     ms ms  V  V  V cm m/s m/s  C   R MEDIAN - Dig II Anti      Wrist Dig II 2.92 3.75 18.9 10.0 16.7 14 48.0 48.0 32.1   R SURAL - Lat Mall      Calf Ankle 3.28 4.48 3.1 8.0 3.9 14 42.7 38.0 31   L SURAL - Lat Mall      Calf Ankle 3.02 4.17 3.8 8.0 5.2 14 46.3 38.0    R SUP PERONEAL      Lat Leg Wong NR NR NR  NR 12.5 NR 38.0 31       Motor NCS      Nerve / Sites Rec. Site Lat Ref. Amp Ref. Rel Amp Dist Yevgeniy Ref. Dur. Area Temp.     ms ms mV mV % cm m/s m/s ms %  C   R MEDIAN - APB      Wrist APB 3.59 4.40 12.6 5.0 100 8   6.35 100 32      Elbow APB 8.49  12.1  96.1 26 53.1 48.0 6.61 96.9 32   R DEEP PERONEAL - EDB      Ankle EDB 5.83 6.00 2.8 2.5 100 8   6.82 100 31      FibHead EDB 16.72  2.6  93.3 36 33.1 38.0 9.17 99.2       Pop Fos EDB 19.64  2.5  89.8 11 37.7 38.0 8.75 103    L DEEP PERONEAL - EDB      Ankle EDB 5.78 6.00 3.7 2.5 100 8   6.67 100 31      FibHead EDB 16.77  3.2  85.9 37 33.7 38.0 6.35 78.7 31      Pop Fos EDB 19.01  3.2  84.7 10 44.7 38.0 6.41 113 31   R TIBIAL - AH      Ankle AH 3.80 6.00 1.4 4.0 100 8   6.51 100 31      Pop Fos AH 17.29  1.2  85.2 43 31.9 38.0 8.91 90.9 31   L TIBIAL - AH      Ankle AH 4.48 6.00 4.2 4.0 100 8   7.55 100 31      Pop Fos AH 18.07  2.7  65.2 41 30.2 38.0 9.48 93.6 31   R PERONEAL - Tib " Ant      Fib Head Tib Ant 3.39  8.2  100    13.70 100 31      Knee Tib Ant 5.31  8.0  98.6 10 51.9  13.65 100 31       F  Wave      Nerve Min F Lat Max F Lat Mean FLat Temp.    ms ms ms  C   L TIBIAL 69.22 70.94 69.87 31.2   R MEDIAN 31.15 34.06 33.12 32       EMG Summary Table     Spontaneous Recruitment MUAP    IA Fib PSW Fasc H.F. Pattern Amp Dur. PPP   R. VAST LATERALIS N None None None None N N N N   R. TIB ANTERIOR N None None None None N 2+ N N   R. GASTROCN (MED) N None None None None N 1+ 1+ N   R. GLUTEUS MED N None None None None N N N N   R. LUMB PSP (L) N None None None None                                            Again, thank you for allowing me to participate in the care of your patient.      Sincerely,    Stone Herzog MD

## 2021-07-13 ENCOUNTER — MYC MEDICAL ADVICE (OUTPATIENT)
Dept: PHYSICAL MEDICINE AND REHAB | Facility: CLINIC | Age: 41
End: 2021-07-13

## 2021-07-13 ENCOUNTER — MYC MEDICAL ADVICE (OUTPATIENT)
Dept: UROLOGY | Facility: CLINIC | Age: 41
End: 2021-07-13

## 2021-07-14 NOTE — TELEPHONE ENCOUNTER
Patient has been scheduled.      Annette Lizama  Surgical Specialties Procedure   [facility] [place]  11/17/2020 8:33 AM

## 2021-07-14 NOTE — TELEPHONE ENCOUNTER
REVIEWED:    Virtual NEW visit of 6/22/21  EMG done 7/5/21 by Dr. Stone Herzog    Summary:  2. Work-up:  --HgbA1C -- 5.6 on 6/29/21  --B12 level with reflex MMA -- 218 (wnl)  --TSH and T4  ? Not completed  --Serum protein immunofixation  ? Not completed  --NCS/EMG of bilateral lower extremities to evaluate for polyneuropathy and also radic screening for RLE done 7/5/21 [facility]     3. Therapy/equipment/braces: he will start PT and OT soon     4. Medications: started gabapentin with slow titration to minimize risk of side effects    Need to book f/u in PMR for September

## 2021-07-14 NOTE — TELEPHONE ENCOUNTER
Shahrzad Herzog, LPN  [facility] Urology Adult [place]; Mg  Procedure Coordinator 15 hours ago (4:37 PM)   RB  Hey guys!     Can you help patient schedule an in person with Dr. Carrillo? Ok to put in a 15 min slot or double book with a procedure?     Thanks!    Routing comment

## 2021-07-29 ENCOUNTER — OFFICE VISIT (OUTPATIENT)
Dept: UROLOGY | Facility: CLINIC | Age: 41
End: 2021-07-29
Payer: COMMERCIAL

## 2021-07-29 DIAGNOSIS — R79.89 LOW TESTOSTERONE: ICD-10-CM

## 2021-07-29 DIAGNOSIS — R97.20 ELEVATED PROSTATE SPECIFIC ANTIGEN (PSA): ICD-10-CM

## 2021-07-29 DIAGNOSIS — Z80.42 FAMILY HISTORY OF PROSTATE CANCER IN FATHER: ICD-10-CM

## 2021-07-29 DIAGNOSIS — R68.82 LOW LIBIDO: Primary | ICD-10-CM

## 2021-07-29 PROCEDURE — 99214 OFFICE O/P EST MOD 30 MIN: CPT | Performed by: UROLOGY

## 2021-07-29 NOTE — NURSING NOTE
Domingo Mccann's goals for this visit include:   Chief Complaint   Patient presents with     Follow Up     results and possible testosterone teaching       He requests these members of his care team be copied on today's visit information:     PCP: No Ref-Primary, Physician    Referring Provider:  No referring provider defined for this encounter.    There were no vitals taken for this visit.    Do you need any medication refills at today's visit?     Shahrzad Herzog LPN on 7/29/2021 at 9:26 AM

## 2021-07-29 NOTE — PROGRESS NOTES
[facility]   CHIEF COMPLAINT   It was my pleasure to see Domingo Mccann who is a 41 year old male for follow-up of low libido, family history of prostate cancer, post Covid.      HPI   Domingo Mccann is a very pleasant 41 year old male   Initially seen 6/8/21:  Domingo Mccann is a 41 year old male who is being seen for evaluation of Erectile dysfunction      He recently had COVID-19 7-8 months ago and has been having post COVID issues  Since COVID his sex drive has been decreased  He had been using sildenafil 20mg tablets and he had been using 40mg  If he tried 60mg then he has bothersome side effects  He has been on sildenafil for the past 5 years     Father did have prostate cancer diagnosed around age 73 and underwent radiation therapy    TODAY 7/29/21:  Follow-up today to discuss his testosterone and PSA results  He continues to have decreased overall energy level and decreased libido  He is also having some lower extremity numbness and tingling since his COVID-19 and is seeing neurology for this    PHYSICAL EXAM  Patient is a 41 year old  male   Vitals: There were no vitals taken for this visit.  There is no height or weight on file to calculate BMI.  General Appearance Adult:   Alert, no acute distress, oriented  HENT: throat/mouth:normal, good dentition  Lungs: no respiratory distress, or pursed lip breathing  Heart: No obvious jugular venous distension present  Abdomen: soft, nontender, no organomegaly or masses  Musculoskeltal: extremities normal, no peripheral edema  Skin: no suspicious lesions or rashes  Neuro: Alert, oriented, speech and mentation normal  Psych: affect and mood normal  Gait: Normal    Component      Latest Ref Rng & Units 6/11/2021   PSA      0 - 4 ug/L 3.13     Component      Latest Ref Rng & Units 6/11/2021 6/22/2021   Testosterone Total      240 - 950 ng/dL 208 (L) 187 (L)   Sex Hormone Binding Globulin      11 - 80 nmol/L 12 11   Free Testosterone Calculated      4.7 - 24.4 ng/dL  6.24 5.73         ASSESSMENT and PLAN  41-year-old man with history of COVID-19 about 9 months ago with some post Covid changes including decreased overall energy, decreased sex drive, and some numbness and tingling.  Also with family history of prostate cancer    Hypogonadism  -We reviewed his testosterone levels which were low at 187 on an early morning lab check  -We discussed that treatment options were symptomatic hypogonadism or low testosterone would include testosterone replacement  -We discussed the impact of testosterone replacement on prostate cancer risk as well as other side effects of treatment  -Given his family history of prostate cancer would like to repeat his PSA in about 5 months prior to initiating testosterone replacement  -Also plan for recheck of his LH level  -We also discussed the prolonged effects of COVID-19 and that given the timing and onset of his symptoms this may be a prolonged symptom of his COVID-19    Family history of prostate cancer  -Reviewed his PSA which was 3.13.  We discussed that this would be considered elevated for someone 41 years old  -We will plan for PSA recheck in about 5 months    Time spent: 20 minutes spent on the date of the encounter doing chart review, history and exam, documentation and further activities as noted above.    Kings Carrillo MD   Urology  [facility]  [facility] Phone: [phone]  [facility] Phone: [phone]

## 2021-08-03 NOTE — TELEPHONE ENCOUNTER
Matilda Ellsworth MD  You 11 days ago   PS  Thanks Nicole!     Matilda    Message text    You  Matilda Ellsworth MD 12 days ago   SB  Regan is booked and please review the last note I sent & his response/

## 2021-08-22 ENCOUNTER — HEALTH MAINTENANCE LETTER (OUTPATIENT)
Age: 41
End: 2021-08-22

## 2021-09-15 ENCOUNTER — LAB (OUTPATIENT)
Dept: LAB | Facility: CLINIC | Age: 41
End: 2021-09-15
Payer: COMMERCIAL

## 2021-09-15 DIAGNOSIS — M54.41 CHRONIC BILATERAL LOW BACK PAIN WITH RIGHT-SIDED SCIATICA: ICD-10-CM

## 2021-09-15 DIAGNOSIS — G89.29 CHRONIC BILATERAL LOW BACK PAIN WITH RIGHT-SIDED SCIATICA: ICD-10-CM

## 2021-09-15 DIAGNOSIS — U07.1 2019 NOVEL CORONAVIRUS DISEASE (COVID-19): ICD-10-CM

## 2021-09-15 DIAGNOSIS — G62.9 NEUROPATHY: ICD-10-CM

## 2021-09-15 LAB
ALBUMIN SERPL-MCNC: 4.1 G/DL (ref 3.4–5)
ALP SERPL-CCNC: 59 U/L (ref 40–150)
ALT SERPL W P-5'-P-CCNC: 39 U/L (ref 0–70)
ANION GAP SERPL CALCULATED.3IONS-SCNC: 5 MMOL/L (ref 3–14)
AST SERPL W P-5'-P-CCNC: 18 U/L (ref 0–45)
BILIRUB SERPL-MCNC: 0.5 MG/DL (ref 0.2–1.3)
BUN SERPL-MCNC: 11 MG/DL (ref 7–30)
CALCIUM SERPL-MCNC: 9.6 MG/DL (ref 8.5–10.1)
CHLORIDE BLD-SCNC: 104 MMOL/L (ref 94–109)
CO2 SERPL-SCNC: 26 MMOL/L (ref 20–32)
CREAT SERPL-MCNC: 0.91 MG/DL (ref 0.66–1.25)
ERYTHROCYTE [DISTWIDTH] IN BLOOD BY AUTOMATED COUNT: 11.8 % (ref 10–15)
GFR SERPL CREATININE-BSD FRML MDRD: >90 ML/MIN/1.73M2
GLUCOSE BLD-MCNC: 115 MG/DL (ref 70–99)
HBA1C MFR BLD: 5.5 % (ref 0–5.6)
HCT VFR BLD AUTO: 39.3 % (ref 40–53)
HGB BLD-MCNC: 14.1 G/DL (ref 13.3–17.7)
MCH RBC QN AUTO: 31.6 PG (ref 26.5–33)
MCHC RBC AUTO-ENTMCNC: 35.9 G/DL (ref 31.5–36.5)
MCV RBC AUTO: 88 FL (ref 78–100)
PLATELET # BLD AUTO: 289 10E3/UL (ref 150–450)
POTASSIUM BLD-SCNC: 4.1 MMOL/L (ref 3.4–5.3)
PROT SERPL-MCNC: 7.7 G/DL (ref 6.8–8.8)
RBC # BLD AUTO: 4.46 10E6/UL (ref 4.4–5.9)
SODIUM SERPL-SCNC: 135 MMOL/L (ref 133–144)
TSH SERPL DL<=0.005 MIU/L-ACNC: 3.37 MU/L (ref 0.4–4)
WBC # BLD AUTO: 5.4 10E3/UL (ref 4–11)

## 2021-09-15 PROCEDURE — 36415 COLL VENOUS BLD VENIPUNCTURE: CPT

## 2021-09-15 PROCEDURE — 85027 COMPLETE CBC AUTOMATED: CPT

## 2021-09-15 PROCEDURE — 83036 HEMOGLOBIN GLYCOSYLATED A1C: CPT

## 2021-09-15 PROCEDURE — 80053 COMPREHEN METABOLIC PANEL: CPT

## 2021-09-15 PROCEDURE — 86334 IMMUNOFIX E-PHORESIS SERUM: CPT

## 2021-09-15 PROCEDURE — 84443 ASSAY THYROID STIM HORMONE: CPT

## 2021-09-15 PROCEDURE — 84165 PROTEIN E-PHORESIS SERUM: CPT

## 2021-09-16 LAB — PROT PATTERN SERPL IFE-IMP: NORMAL

## 2021-09-23 ENCOUNTER — OFFICE VISIT (OUTPATIENT)
Dept: PHYSICAL MEDICINE AND REHAB | Facility: CLINIC | Age: 41
End: 2021-09-23
Payer: COMMERCIAL

## 2021-09-23 VITALS
OXYGEN SATURATION: 97 % | RESPIRATION RATE: 16 BRPM | DIASTOLIC BLOOD PRESSURE: 96 MMHG | SYSTOLIC BLOOD PRESSURE: 155 MMHG | HEART RATE: 85 BPM

## 2021-09-23 DIAGNOSIS — U07.1 2019 NOVEL CORONAVIRUS DISEASE (COVID-19): ICD-10-CM

## 2021-09-23 DIAGNOSIS — M79.2 NEUROPATHIC PAIN: ICD-10-CM

## 2021-09-23 DIAGNOSIS — Z74.09 IMPAIRED FUNCTIONAL MOBILITY, BALANCE, GAIT, AND ENDURANCE: ICD-10-CM

## 2021-09-23 DIAGNOSIS — G62.9 POLYNEUROPATHY: Primary | ICD-10-CM

## 2021-09-23 PROCEDURE — 99214 OFFICE O/P EST MOD 30 MIN: CPT | Performed by: PHYSICAL MEDICINE & REHABILITATION

## 2021-09-23 RX ORDER — CICLOPIROX 7.7 MG/G
GEL TOPICAL
COMMUNITY
Start: 2021-08-09

## 2021-09-23 RX ORDER — PREGABALIN 50 MG/1
50 CAPSULE ORAL 2 TIMES DAILY
Qty: 60 CAPSULE | Refills: 3 | Status: SHIPPED | OUTPATIENT
Start: 2021-09-23 | End: 2022-01-21

## 2021-09-23 RX ORDER — VALACYCLOVIR HYDROCHLORIDE 1 G/1
TABLET, FILM COATED ORAL
COMMUNITY
Start: 2021-06-29 | End: 2021-09-23

## 2021-09-23 ASSESSMENT — PAIN SCALES - GENERAL: PAINLEVEL: MODERATE PAIN (4)

## 2021-09-23 NOTE — LETTER
2021       RE: Domingo Mccann  [address] [place]  [place] [address]     Dear Colleague,    Thank you for referring your patient, Domingo Mccann, to the [facility] PHYS[facility] MEDICINE [facility]HABILIT[facility]IN[facility] [place] at [facility]. Please see a copy of my visit note below.           PM&R Clinic Note     Patient Name: Domingo Mccann : 1980 Medical Record: [id number]            History of Present Illness:     Domingo Mccann is a 41 year old male who was referred to our clinic for more evaluation of paresthesia in his feet. Initial consult on ; referral was Dr. Manjarrez post-COVID clinic.     Background from the consult note  He was diagnosed with COVID-19 infection in 10/2020 and was seen in post-COVID clinic by Dr. Manjarrez. His residual symptoms include reduced exercise tolerance, fatigue, worsening of erectile dysfunction, and the bilateral foot pain/paresthesias.    Today, he reported chronic h/o right sided low back pain for the past 8 years. No specific injury or inciting event; pain started gradually and has been intermittently persistent since then. It's a dull achy pain located at low back area with occasional radiation to bilateral lower extremities. Radicular pain is located at posterior thigh b/l and stops at the knees. Pain gets worse with prolonged sitting and intense workout. His pain usually improves after 1-23 days by time or by taking advil. No prior h/o paresthesia in feet before his COVID infection. No h/o surgery on his back or bilateral lower extremities. He didn't see PT and never had any injections for his back pain. He saw a chiropractor a few times which was beneficial. He also stopped road running and will run on a trail or treadmill only.     Pain and paresthesia in his feet started after his illness with COVID-19 infection and have been persistent since then. He describes numbness and tingling which are mostly  "bothersome in the morning. He also has some achy pain and \"tight sensation\" in his feet along with numbness and tingling.  His symptoms involve his toes and the plantar aspect of his feet, and are worse on the right side. His symptoms improve throughout the day.     He has some balance issues secondary to the pain and paresthesia in his feet. No issues with bowel and bladder function. He has h/o BERT and was using CPAP before. He underwent new Inspire device implantation about a year ago for the management of his BERT. This is managed by neurology team at the [facility]. His BERT has improved but he still has difficulty with his sleep quality and wakes up tired in the morning. He has difficulty with both falling and staying asleep.     He has tried different types of shoes with no specific benefits. No therapies in the past. No h/o similar issues in the past. No FH of neuropathy.     Interval history   No new medical issues, ER visit or hospitalization. He is followed by urology team for hypogonadism and elevated PSA. Completed NCS/EMG and blood work.     His symptoms are relatively unchanged when reviewed from the above note. His fatigue and endurance have improved but paresthesia in feer is the same. They are worse in the morning and improve by later morning / midday. He bought a new pair of shoes which has been beneficial.     Started gabapentin with 300mg at night time and he was sleepy in the morning. He also had significant fatigue when he took a dose during the day and couldn't work so stopped taking it. He was not contacted to start therapies as ordered by Dr. Manjarrez.              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  He is active and regularly works out; less activity tolerance since his COVID infection.            Family History:     No family history on file.            Medications:     Current Outpatient Medications   Medication Sig Dispense Refill     allopurinol (ZYLOPRIM) 300 MG tablet Take 300 mg by mouth daily       ALPRAZolam (XANAX) 0.5 MG tablet Take 0.5 mg by mouth as needed       atenolol-chlorthalidone (TENORETIC) 50-25 MG tablet Take 1 tablet by mouth daily       enalapril (VASOTEC) 20 MG tablet Take 40 mg by mouth daily       gabapentin (NEURONTIN) 300 MG capsule Start gabapentin as follows:  1 tab= 300mg    AM   PM   Bedtime  0   0   300mg (1 tab).  After 3 days, increase as tolerated to the next line  300mg (1 tab)  0   300mg (1 tab).  After 3 days, increase as tolerated to the next line  300mg (1 tab)  300mg (1 tab)  300mg (1 tab).  After 3 days, increase as tolerated to the next line  300mg (1 tab)  300mg (1 tab)  600mg (2 tabs).  After 3 days, increase as tolerated to the next line  600mg (2 tabs) 300mg (1 tab)  600mg (2 tabs).  After 3 days, increase as tolerated to the next line  600mg (2 tabs) 600mg (2 tabs) 600mg (2 tabs).  Call with any problems or when you are at this dose.    Caution for sedation.    Do not drive until you know how the medication affects you.   You can go slower if you need to or increasing only one dose at a time.  Do not stop abruptly once at higher doses.  This medication must be tapered off. 270 capsule 1     hydrochlorothiazide (HYDRODIURIL) 25 MG tablet Take 25 mg by mouth daily       tadalafil (CIALIS) 5 MG tablet Take 1 tablet (5 mg) by mouth every 24 hours  30 tablet 11            Allergies:     No Known Allergies           ROS:     A focused ROS is negative other than the symptoms noted above in the HPI.             Physical Examiniation:     BP (!) 155/96   Pulse 85   Resp 16   SpO2 97%      Gen: NAD, pleasant and cooperative   Cardio: 2+ radial pulse, well perfused  Pulm: non-labored breathing in room air, symmetrical chest rise  Abd: benign  Ext: WWP, no edema in BLE, no tenderness in calves.   Neuro:   CN 2-12 intact, AAOx4  5/5 in C5-T1 and L2-S1 myotomes  Sensation was intact to LT and PP at bilateral upper extremities; diminished to LT and PP in both feet, worse on the right up to ankle. Noted ext at MTPs and flex at PIPs - new per his report over the past few months or maybe one year   Reflexes 2+ at bilateral biceps and brachioradialis; 2+ at R knee and 3+ on the left, 1+ at ankles and symmetric   Negative brooke's b/l and quite babinski b/l; no clonus at ankles    MSK:  Symmetric positioning of bilateral shoulders and iliac crests   ROM was intact at bilateral upper and lower extremities   Had mild tenderness to palpation at L2-5 midline and paraspinals; no tenderness to palpation at ASISes, PSISes, GTs, ITs, piriformis muscles b/l   Negative bilateral SLR, hip scour, FADIR, DAMON, and Yeoman's; negative forward Kimani's test    Gait: normal gait pattern and step length. Able to do tandem walk and also walk on toes and heels.            Laboratory/Imaging:     HgbA1c was 5.5 in 2018      NCS/EMG 7/5  Interpretation:  This is an abnormal study. There is electrophysiologic evidence of a mild moderate severe, axonal, length-dependant axonal, sensorimotor polyneuropathy.     This is an abnormal study. There is electrophysiologic evidence of a chronic, somewhat asymmetric, motor > sensory large fiber polyneuropathy affecting the lower limbs characterized by equivocal degrees of conduction velocity slowing in lower limb motor nerves. Although the conduction  "velocity slowing can not be explained by amplitude-dependant slowing, the degree of slowing is of an insufficient severity to classify the neuropathy as \"demyelinating\" with certainty. Clinical correlation is recommended.      Blood work 9/15  HgbA1c 5.5  TSH wnl  B12 wnl  Immunofixation ELP No monoclonal protein seen on immunofixation. Pathological significance requires clinical correlation.               Assessment/Plan:       H/o COVID infection in 10/2020    Residual symptoms include fatigue, limited activity tolerance, pain and paresthesia in feet and ED/hypogonadism     H/o BERT with implanted Inspire in place since 8/2020    Reviewed lab results and NCS/EMG. Basic screening was negative for common causes of polyenuropathy. He doesn't drink alcohol and has no FH of polyneuropathy. His polyneuropathy is likely idiopathic and his COVID infection might have contributed to his symptoms too. Discussed referral to neuromuscular clinic and sent a message to Dr. Herzog. He kindly agreed to see Regan for more evaluation and possibly long term follow up.     Reviewed goals for PT and sent a new order.   He didn't tolerate gabapentin but his symptoms are significant enough that he likes to try a different medication. Will start low dose lyrica and monitor.       1. Work-up: no new today     2. Therapy/equipment/braces: new referral to PT    3. Medications: low dose lyrica as above; will titrate as tolerated     4. Interventions: none today     5. Referral / follow up with other providers: new referral to neuromuscular clinic; will f/u with PCP, and urology     6. Follow up: in 6-9 months to reassess rehab needs     Matilda Ellsworth MD  Physical Medicine & Rehabilitation    35 minutes spent on the date of the encounter doing chart review, video visit, documentation and further activities as noted above        Again, thank you for allowing me to participate in the care of your patient.      Sincerely,    Matilda Ellsworth MD      "

## 2021-09-23 NOTE — PROGRESS NOTES
"       PM&R Clinic Note     Patient Name: Domingo Mccann : 1980 Medical Record: [id number]            History of Present Illness:     Domingo Mccann is a 41 year old male who was referred to our clinic for more evaluation of paresthesia in his feet. Initial consult on ; referral was Dr. Manjarrez post-COVID clinic.     Background from the consult note  He was diagnosed with COVID-19 infection in 10/2020 and was seen in post-COVID clinic by Dr. Manjarrez. His residual symptoms include reduced exercise tolerance, fatigue, worsening of erectile dysfunction, and the bilateral foot pain/paresthesias.    Today, he reported chronic h/o right sided low back pain for the past 8 years. No specific injury or inciting event; pain started gradually and has been intermittently persistent since then. It's a dull achy pain located at low back area with occasional radiation to bilateral lower extremities. Radicular pain is located at posterior thigh b/l and stops at the knees. Pain gets worse with prolonged sitting and intense workout. His pain usually improves after 1-23 days by time or by taking advil. No prior h/o paresthesia in feet before his COVID infection. No h/o surgery on his back or bilateral lower extremities. He didn't see PT and never had any injections for his back pain. He saw a chiropractor a few times which was beneficial. He also stopped road running and will run on a trail or treadmill only.     Pain and paresthesia in his feet started after his illness with COVID-19 infection and have been persistent since then. He describes numbness and tingling which are mostly bothersome in the morning. He also has some achy pain and \"tight sensation\" in his feet along with numbness and tingling.  His symptoms involve his toes and the plantar aspect of his feet, and are worse on the right side. His symptoms improve throughout the day.     He has some balance issues secondary to the pain and paresthesia in his feet. No " issues with bowel and bladder function. He has h/o BERT and was using CPAP before. He underwent new Inspire device implantation about a year ago for the management of his BERT. This is managed by neurology team at the [facility]. His BERT has improved but he still has difficulty with his sleep quality and wakes up tired in the morning. He has difficulty with both falling and staying asleep.     He has tried different types of shoes with no specific benefits. No therapies in the past. No h/o similar issues in the past. No FH of neuropathy.     Interval history   No new medical issues, ER visit or hospitalization. He is followed by urology team for hypogonadism and elevated PSA. Completed NCS/EMG and blood work.     His symptoms are relatively unchanged when reviewed from the above note. His fatigue and endurance have improved but paresthesia in feer is the same. They are worse in the morning and improve by later morning / midday. He bought a new pair of shoes which has been beneficial.     Started gabapentin with 300mg at night time and he was sleepy in the morning. He also had significant fatigue when he took a dose during the day and couldn't work so stopped taking it. He was not contacted to start therapies as ordered by Dr. Manjarrez.              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  He is active and regularly  works out; less activity tolerance since his COVID infection.            Family History:     No family history on file.            Medications:     Current Outpatient Medications   Medication Sig Dispense Refill     allopurinol (ZYLOPRIM) 300 MG tablet Take 300 mg by mouth daily       ALPRAZolam (XANAX) 0.5 MG tablet Take 0.5 mg by mouth as needed       atenolol-chlorthalidone (TENORETIC) 50-25 MG tablet Take 1 tablet by mouth daily       enalapril (VASOTEC) 20 MG tablet Take 40 mg by mouth daily       gabapentin (NEURONTIN) 300 MG capsule Start gabapentin as follows:  1 tab= 300mg    AM   PM   Bedtime  0   0   300mg (1 tab).  After 3 days, increase as tolerated to the next line  300mg (1 tab)  0   300mg (1 tab).  After 3 days, increase as tolerated to the next line  300mg (1 tab)  300mg (1 tab)  300mg (1 tab).  After 3 days, increase as tolerated to the next line  300mg (1 tab)  300mg (1 tab)  600mg (2 tabs).  After 3 days, increase as tolerated to the next line  600mg (2 tabs) 300mg (1 tab)  600mg (2 tabs).  After 3 days, increase as tolerated to the next line  600mg (2 tabs) 600mg (2 tabs) 600mg (2 tabs).  Call with any problems or when you are at this dose.    Caution for sedation.    Do not drive until you know how the medication affects you.   You can go slower if you need to or increasing only one dose at a time.  Do not stop abruptly once at higher doses.  This medication must be tapered off. 270 capsule 1     hydrochlorothiazide (HYDRODIURIL) 25 MG tablet Take 25 mg by mouth daily       tadalafil (CIALIS) 5 MG tablet Take 1 tablet (5 mg) by mouth every 24 hours 30 tablet 11            Allergies:     No Known Allergies           ROS:     A focused ROS is negative other than the symptoms noted above in the HPI.             Physical Examiniation:     BP (!) 155/96   Pulse 85   Resp 16   SpO2 97%      Gen: NAD, pleasant and cooperative   Cardio: 2+ radial pulse, well perfused  Pulm: non-labored breathing in  "room air, symmetrical chest rise  Abd: benign  Ext: WWP, no edema in BLE, no tenderness in calves.   Neuro:   CN 2-12 intact, AAOx4  5/5 in C5-T1 and L2-S1 myotomes  Sensation was intact to LT and PP at bilateral upper extremities; diminished to LT and PP in both feet, worse on the right up to ankle. Noted ext at MTPs and flex at PIPs - new per his report over the past few months or maybe one year   Reflexes 2+ at bilateral biceps and brachioradialis; 2+ at R knee and 3+ on the left, 1+ at ankles and symmetric   Negative brooke's b/l and quite babinski b/l; no clonus at ankles    MSK:  Symmetric positioning of bilateral shoulders and iliac crests   ROM was intact at bilateral upper and lower extremities   Had mild tenderness to palpation at L2-5 midline and paraspinals; no tenderness to palpation at ASISes, PSISes, GTs, ITs, piriformis muscles b/l   Negative bilateral SLR, hip scour, FADIR, DAMON, and Yeoman's; negative forward Kimani's test    Gait: normal gait pattern and step length. Able to do tandem walk and also walk on toes and heels.            Laboratory/Imaging:     HgbA1c was 5.5 in 2018      NCS/EMG 7/5  Interpretation:  This is an abnormal study. There is electrophysiologic evidence of a mild moderate severe, axonal, length-dependant axonal, sensorimotor polyneuropathy.     This is an abnormal study. There is electrophysiologic evidence of a chronic, somewhat asymmetric, motor > sensory large fiber polyneuropathy affecting the lower limbs characterized by equivocal degrees of conduction velocity slowing in lower limb motor nerves. Although the conduction velocity slowing can not be explained by amplitude-dependant slowing, the degree of slowing is of an insufficient severity to classify the neuropathy as \"demyelinating\" with certainty. Clinical correlation is recommended.      Blood work 9/15  HgbA1c 5.5  TSH wnl  B12 wnl  Immunofixation ELP No monoclonal protein seen on immunofixation. Pathological " significance requires clinical correlation.               Assessment/Plan:       H/o COVID infection in 10/2020    Residual symptoms include fatigue, limited activity tolerance, pain and paresthesia in feet and ED/hypogonadism     H/o BERT with implanted Inspire in place since 8/2020    Reviewed lab results and NCS/EMG. Basic screening was negative for common causes of polyenuropathy. He doesn't drink alcohol and has no FH of polyneuropathy. His polyneuropathy is likely idiopathic and his COVID infection might have contributed to his symptoms too. Discussed referral to neuromuscular clinic and sent a message to Dr. Herzog. He kindly agreed to see Regan for more evaluation and possibly long term follow up.     Reviewed goals for PT and sent a new order.   He didn't tolerate gabapentin but his symptoms are significant enough that he likes to try a different medication. Will start low dose lyrica and monitor.       1. Work-up: no new today     2. Therapy/equipment/braces: new referral to PT    3. Medications: low dose lyrica as above; will titrate as tolerated     4. Interventions: none today     5. Referral / follow up with other providers: new referral to neuromuscular clinic; will f/u with PCP, and urology     6. Follow up: in 6-9 months to reassess rehab needs     Matilda Ellsworth MD  Physical Medicine & Rehabilitation    35 minutes spent on the date of the encounter doing chart review, video visit, documentation and further activities as noted above

## 2021-10-04 ENCOUNTER — HOSPITAL ENCOUNTER (OUTPATIENT)
Dept: PHYSICAL THERAPY | Facility: CLINIC | Age: 41
Setting detail: THERAPIES SERIES
End: 2021-10-04
Payer: COMMERCIAL

## 2021-10-04 DIAGNOSIS — G62.9 POLYNEUROPATHY: ICD-10-CM

## 2021-10-04 DIAGNOSIS — U07.1 2019 NOVEL CORONAVIRUS DISEASE (COVID-19): ICD-10-CM

## 2021-10-04 DIAGNOSIS — Z74.09 IMPAIRED FUNCTIONAL MOBILITY, BALANCE, GAIT, AND ENDURANCE: ICD-10-CM

## 2021-10-04 PROCEDURE — 97161 PT EVAL LOW COMPLEX 20 MIN: CPT | Mod: GP | Performed by: PHYSICAL THERAPIST

## 2021-10-04 PROCEDURE — 97110 THERAPEUTIC EXERCISES: CPT | Mod: GP | Performed by: PHYSICAL THERAPIST

## 2021-10-04 NOTE — PROGRESS NOTES
Functional Gait Assessment (FGA): The FGA assesses postural stability during various walking tasks.   Gait assistive device used: None    Patient Score: 29/30  Scores of <22/30 have been correlated with predicting falls in community-dwelling older adults according to Kay & Cloe 2010.   Scores of <18/30 have been correlated with increased risk for falls in patients with Parkinsons Disease according to Ethan Lind Zhou et al 2014.  Minimal Detectable Change for patients with acute/chronic stroke = 4.2 according to Thitonny & Hiramschel 2009  Minimal Detectable Change for patients with vestibular disorder = 8 according to Kay & Cole 2010    Assessment (rationale for performing, application to patient s function & care plan): Performed to assess balance with gait. He is scoring a 29/30 which is above fall risk and showing no major imbalance.   Minutes billed as physical performance test: 5       10/04/21 1000   Signing Clinician's Name / Credentials   Signing clinician's name / credentials Sondra Garza, PT, DPT   Functional Gait Assessment (AYALA Villanueva., Bella, G. F., et al. (2004))   1. GAIT LEVEL SURFACE 3   2. CHANGE IN GAIT SPEED 3   3. GAIT WITH HORIZONTAL HEAD TURNS 3   4. GAIT WITH VERTICAL HEAD TURNS 3   5. GAIT AND PIVOT TURN 3   6. STEP OVER OBSTACLE 3   7. GAIT WITH NARROW BASE OF SUPPORT 2   8. GAIT WITH EYES CLOSED 3   9. AMBULATING BACKWARDS 3   10. STEPS 3   Total Functional Gait Assessment Score   TOTAL SCORE: (MAXIMUM SCORE 30) 29

## 2021-10-04 NOTE — PROGRESS NOTES
"   10/04/21 1000   Quick Adds   Quick Adds Certification   Type of Visit Initial OP PT Evaluation   General Information   Start of Care Date 10/04/21   Referring Physician Dr. Matilda Ellsworth    Orders Evaluate and Treat as Indicated   Additional Orders please work on edurance/fatigue, and balance   Order Date 09/23/21   Medical Diagnosis polyneuropathy; post COVID-19; Impaired functional mobility, balance, gait, and endurance    Onset of illness/injury or Date of Surgery   (late october 2020)   Surgical/Medical history reviewed Yes  (BERT, HTN, h/o gout, hx of COVID-19 )   Pertinent history of current problem (include personal factors and/or comorbidities that impact the POC) COVID-19 in October 2020. He has noticed increased numbness in his feet since then. Worse in the AM. Pain as well on bottoms of his feet. Started Lyrica 1 week ago, he is not sure if thinks this is helping, Taking it mid day. Tried gabapentin with increased sleepiness. Dress shoes can increase the pain. Running shoes feel better.\" Annoying\" feeling all day. There may be some issues with his balance. Tennis shoes are better for his balance. Running on the road he is not able to do because of knees and pain in feet. Feels better in the grass. L ankle surgery with plate and screw a few years ago. Sciatic pain in L side that is intermittent. Notices more instability walking in the dark at night.    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 No   Have you fallen and had an injury in the past " year? No   Timed Up and Go score (seconds) not tested    Is patient a fall risk? No   Fall screen comments see FGA, 29/30   Abuse Screen (yes response referral indicated)   Feels Unsafe at Home or Work/School no   Feels Threatened by Someone no   Does Anyone Try to Keep You From Having Contact with Others or Doing Things Outside Your Home? no   Physical Signs of Abuse Present no   Pain   Pain comments 3/10 pain in feet, toes are numb    Cognitive Status Examination   Orientation orientation to person, place and time   Level of Consciousness alert   Follows Commands and Answers Questions 100% of the time   Personal Safety and Judgment intact   Memory intact   Integumentary   Integumentary Comments no edema in LEs    Posture   Posture Comments WFLs    Range of Motion (ROM)   ROM Comment gastroc and soleus tightness    Strength   Strength Comments decreased stance time in SLS for age, 15 secs B vs 30 secs, increased ankle instability during assessment    Transfer Skills   Transfer Comments sit to stand without UEs    Gait   Gait Comments ambulates with steady gait, arm swing, proper foot clearance    Gait Special Tests   Gait Special Tests 25 FOOT TIMED WALK;FUNCTIONAL GAIT ASSESSMENT   Gait Special Tests 25 Foot Timed Walk   Seconds 6.8   Steps 13 Steps   Gait Special Tests Functional Gait Assessment Score out of 30   Score out of 30 29   Comments see progress note    Balance Special Tests   Balance Special Tests Single leg stance right;Single leg stance left;Modified CTSIB Conditions   Balance Special Tests Single Leg Stance Right,   Right, seconds 15 Seconds   Comments increased ankle instability, < 5 secs EC    Balance Special Tests Single Leg Stance Left   Left, seconds 15 Seconds   Comments increased ankle instability, < 5 secs EC    Balance Special Tests Modified CTSIB Conditions   Condition 1, seconds 30 Seconds   Condition 2, seconds 30 Seconds   Condition 4, seconds 30 Seconds   Condition 5, seconds 30  Seconds   Modified CTSIB Comments increased sway condition 5 but can maintain for 30 secs    Sensory Examination   Sensory Perception Comments reports numbness in B feet, worse in the toes. Pain as well at bottoms of his feet.    Planned Therapy Interventions   Planned Therapy Interventions ROM;strengthening;stretching;gait training;balance training;neuromuscular re-education   Clinical Impression   Criteria for Skilled Therapeutic Interventions Met yes, treatment indicated   PT Diagnosis mild balance impairment and weakness/tightness    Influenced by the following impairments decreased ankle stability, strength and length and mild balnace impairments    Functional limitations due to impairments unable to perform normal exercise routine i.e. and decreased confidence/stability with work tasks    Clinical Presentation Stable/Uncomplicated   Clinical Presentation Rationale stable medically. PT impairments, clinical judgement    Clinical Decision Making (Complexity) Low complexity   Therapy Frequency other (see comments)  (1 eval and treatment )   Predicted Duration of Therapy Intervention (days/wks) 1 day    Risk & Benefits of therapy have been explained Yes   Patient, Family & other staff in agreement with plan of care Yes   Clinical Impression Comments Patient presents wtih hx of neuropathy creating pain and numbess in B feet. Patient with mild weakness and tightness in ankles creating mild balance issues. He will benefit from skilled PT to address these areas of impairments and discuss foot wear.    GOALS   PT Eval Goals 1   Goal 1   Goal Identifier foot wear/exercise    Goal Description Domingo will demonstrate understanding regarding footwear to decrease pain as well as HEP to improve strength, muscle length and balance.    Target Date 10/04/21   Date Met 10/04/21   Total Evaluation Time   PT Ofelia Low Complexity Minutes (23823) 30

## 2021-10-17 ENCOUNTER — HEALTH MAINTENANCE LETTER (OUTPATIENT)
Age: 41
End: 2021-10-17

## 2021-11-29 ENCOUNTER — LAB (OUTPATIENT)
Dept: LAB | Facility: CLINIC | Age: 41
End: 2021-11-29
Payer: COMMERCIAL

## 2021-11-29 DIAGNOSIS — R68.82 LOW LIBIDO: ICD-10-CM

## 2021-11-29 DIAGNOSIS — R97.20 ELEVATED PROSTATE SPECIFIC ANTIGEN (PSA): ICD-10-CM

## 2021-11-29 DIAGNOSIS — Z80.42 FAMILY HISTORY OF PROSTATE CANCER IN FATHER: ICD-10-CM

## 2021-11-29 DIAGNOSIS — R79.89 LOW TESTOSTERONE: ICD-10-CM

## 2021-11-29 LAB
LH SERPL-ACNC: 3 IU/L (ref 1.5–9.3)
PSA SERPL-MCNC: 3.04 UG/L (ref 0–4)
SHBG SERPL-SCNC: 11 NMOL/L (ref 11–80)

## 2021-11-29 PROCEDURE — 36415 COLL VENOUS BLD VENIPUNCTURE: CPT

## 2021-11-29 PROCEDURE — 84153 ASSAY OF PSA TOTAL: CPT

## 2021-11-29 PROCEDURE — 83002 ASSAY OF GONADOTROPIN (LH): CPT

## 2021-11-29 PROCEDURE — 84270 ASSAY OF SEX HORMONE GLOBUL: CPT

## 2021-11-29 PROCEDURE — 84403 ASSAY OF TOTAL TESTOSTERONE: CPT

## 2021-12-01 LAB
TESTOST FREE SERPL-MCNC: 3.77 NG/DL
TESTOST SERPL-MCNC: 125 NG/DL (ref 240–950)

## 2021-12-02 ENCOUNTER — VIRTUAL VISIT (OUTPATIENT)
Dept: UROLOGY | Facility: CLINIC | Age: 41
End: 2021-12-02
Payer: COMMERCIAL

## 2021-12-02 DIAGNOSIS — R79.89 LOW TESTOSTERONE: ICD-10-CM

## 2021-12-02 DIAGNOSIS — R68.82 LOW LIBIDO: Primary | ICD-10-CM

## 2021-12-02 DIAGNOSIS — N52.9 ERECTILE DYSFUNCTION, UNSPECIFIED ERECTILE DYSFUNCTION TYPE: ICD-10-CM

## 2021-12-02 PROCEDURE — 99214 OFFICE O/P EST MOD 30 MIN: CPT | Mod: 95 | Performed by: UROLOGY

## 2021-12-02 NOTE — PROGRESS NOTES
[facility]   CHIEF COMPLAINT   It was my pleasure to see Domingo Mccann who is a 41 year old male for follow-up of low libido, family history of prostate cancer, post Covid.      HPI   Domingo Mccann is a very pleasant 41 year old male   Initially seen 6/8/21:  Domingo Mccann is a 41 year old male who is being seen for evaluation of Erectile dysfunction      He recently had COVID-19 7-8 months ago and has been having post COVID issues  Since COVID his sex drive has been decreased  He had been using sildenafil 20mg tablets and he had been using 40mg  If he tried 60mg then he has bothersome side effects  He has been on sildenafil for the past 5 years     Father did have prostate cancer diagnosed around age 73 and underwent radiation therapy    7/29/21:  Follow-up today to discuss his testosterone and PSA results  He continues to have decreased overall energy level and decreased libido  He is also having some lower extremity numbness and tingling since his COVID-19 and is seeing neurology for this    TODAY 12/2/21:  He has been doing ok  Continues to have fatigue  He does note that the Cialis 5 mg daily is generally working well, though at times his erection is not as firm as he would like and he is wondering if there is anything stronger    PHYSICAL EXAM  Patient is a 41 year old  male   Vitals: There were no vitals taken for this visit.  There is no height or weight on file to calculate BMI.  General: No evidence of distress   Lungs: normal respiratory effort  Neuro: Alert, oriented, speech and mentation normal  Psych: affect and mood normal      Component PSA   Latest Ref Rng & Units 0.00 - 4.00 ug/L   6/11/2021 3.13   11/29/2021 3.04     Component      Latest Ref Rng & Units 6/11/2021 6/22/2021   Testosterone Total      240 - 950 ng/dL 208 (L) 187 (L)   Sex Hormone Binding Globulin      11 - 80 nmol/L 12 11   Free Testosterone Calculated      4.7 - 24.4 ng/dL 6.24 5.73     Component      Latest Ref Rng & Units  11/29/2021   Free Testosterone Calculated      ng/dL 3.77   Testosterone Total      240 - 950 ng/dL 125 (L)   Lutropin      1.5 - 9.3 IU/L 3.0   Sex Hormone Binding Globulin      11 - 80 nmol/L 11         ASSESSMENT and PLAN  41-year-old man with history of COVID-19 about 9 months ago with some post Covid changes including decreased overall energy, decreased sex drive, and some numbness and tingling.  Also with family history of prostate cancer    Hypogonadism  -We again reviewed his testosterone levels which remain low with his most recent recheck at 125  -He had a normal LH level  -We discussed the option for testosterone replacement therapy and he is interested in this  -I will have my RN reach out to him to schedule AN RN visit for excision of intramuscular injections  -We also discussed the prolonged effects of COVID-19 and that given the timing and onset of his symptoms this may be a prolonged symptom of his COVID-19    Erectile dysfunction  -We discussed that he may add an additional 5 mg dose of Cialis on days when sexual activity as planned    Family history of prostate cancer  -Reviewed his PSA history and his most recent PSA decreased slightly to 3.04 from 3.13  -We discussed the impact of testosterone on PSA and prostate cancer  -We will plan for close monitoring of his PSA    Time spent: 20 minutes spent on the date of the encounter doing chart review, history and exam, documentation and further activities as noted above.    Kings Carrillo MD   Urology  [facility]  [facility] Phone: [phone]  [facility] Phone: [phone]      Domingo Mccann  who is being evaluated via a billable video visit.      How would you like to obtain your AVS? MyChart  If the video visit is dropped, the invitation should be resent by: Text to cell phone: [phone]  Will anyone else be joining your video visit? No    Video-Visit Details-converted  to telephone only due to connection issues

## 2021-12-06 ENCOUNTER — MYC MEDICAL ADVICE (OUTPATIENT)
Dept: PEDIATRICS | Facility: CLINIC | Age: 41
End: 2021-12-06
Payer: COMMERCIAL

## 2021-12-06 NOTE — TELEPHONE ENCOUNTER
[facility] Call Center    Phone Message    May a detailed message be left on voicemail: no     Reason for Call: Other: Pt returning call.  Needs to schedule RN visits for testosterone injections. Please call back.      Action Taken: Message routed to:  Adult Clinics: Urology p [id number]    Travel Screening: Not Applicable

## 2021-12-07 NOTE — CONFIDENTIAL NOTE
Called and spoke to patient. Nurse only appointment for injection teaching scheduled for 12/10/21 at 1:00pm at [facility].    Bess Simmons RN, BSN

## 2021-12-10 ENCOUNTER — MYC MEDICAL ADVICE (OUTPATIENT)
Dept: UROLOGY | Facility: CLINIC | Age: 41
End: 2021-12-10

## 2021-12-10 ENCOUNTER — ALLIED HEALTH/NURSE VISIT (OUTPATIENT)
Dept: NURSING | Facility: CLINIC | Age: 41
End: 2021-12-10
Payer: COMMERCIAL

## 2021-12-10 DIAGNOSIS — E29.1 HYPOGONADISM MALE: Primary | ICD-10-CM

## 2021-12-10 PROCEDURE — 99211 OFF/OP EST MAY X REQ PHY/QHP: CPT

## 2021-12-10 RX ORDER — TESTOSTERONE CYPIONATE 200 MG/ML
100 INJECTION, SOLUTION INTRAMUSCULAR WEEKLY
Qty: 10 ML | Refills: 0 | Status: CANCELLED | OUTPATIENT
Start: 2021-12-10

## 2021-12-10 RX ORDER — TESTOSTERONE CYPIONATE 200 MG/ML
100 INJECTION, SOLUTION INTRAMUSCULAR WEEKLY
Qty: 10 ML | Refills: 0 | Status: SHIPPED | OUTPATIENT
Start: 2021-12-10 | End: 2022-03-23

## 2021-12-10 NOTE — CONFIDENTIAL NOTE
Message sent to Dr. Carrillo with request to review and place orders for testosterone medication following today's nurse visit for testosterone injection teaching.    Bess Simmons RN, BSN

## 2021-12-10 NOTE — PATIENT INSTRUCTIONS
It is recommended for you to complete follow up labs (testosterone, PSA, and hemoglobin/hematocrit) 1 month after starting testosterone injection. It is best to have labs drawn 1-2 days before next injection is due.      Patient Education     Discharge Instructions: Giving Yourself an Intramuscular (IM) Injection  Your healthcare provider has prescribed a medicine that must be given by IM (intramuscular) injection. IM injections use a needle and syringe to send medicine to large muscles in your body. IM injections are usually given in the thigh, hip, or upper arm.   You were shown how to give yourself an IM injection in the hospital. This sheet will help you remember those steps when you are at home.   Name of medicine: ________________________   Amount per injection: ________________________   Times per day: _____________________________   Number of days: ___________________________  Step 1. Get ready  Gather your supplies. You ll need:    Medicine    Syringe    2 needles (one for drawing the medicine from the bottle (vial), another for giving the injection)    Alcohol pads    2-by-2-inch gauze pad    A special container to throw out the used needles and syringe (sharps container). You can buy a sharps container at a drugstore or medical supply store. You can also use an empty laundry detergent bottle, or any other puncture-proof container and lid.  Wash your hands well with soap and water or an alcohol-based hand  before and after all IM injections.   Step 2. Prepare your medicine    Be sure you have the right medicine. Look at the label on the medicine bottle and make sure it matches the name of the medicine you were prescribed.    Check the expiration date. If it is past this date, throw the medicine away.    Don t use the medicine if it is discolored or has anything floating in it.    Clean the rubber stopper on the top of the bottle each time with an alcohol pad. Don t touch the top of the bottle  after it has been cleaned with the alcohol.    Step 3. Prepare your needle and syringe     Image 1        Image 2        Image 3       Attach the needle to the syringe, if it is not already attached.    Take off the needle cap or needle guard and pull back on the end of the syringe (plunger).    Draw air into the syringe. The amount of air should be equal to the amount of medicine you need to take. Be careful. Don t touch the needle.    Insert the needle into the rubber stopper on top of the medicine bottle. Push down on the plunger to push the air into the bottle (Image 1).    Leave the syringe in the bottle. Now turn the bottle upside down so that the bottle is on top and the syringe is on the bottom (Image 2).    Make sure that the tip of the needle is below the level of the liquid in the bottle. Pull back on the plunger. This will cause the medicine to fill the syringe (Image 3).    Stop pulling the plunger when the right amount of medicine is in the syringe, but don t remove the needle from the bottle.  Step 4. Remove any air bubbles     Image 4       Check the syringe for air bubbles.    To remove air bubbles, tap the barrel of the syringe with your finger or knuckle. This will move any bubbles to the top of the syringe (Image 4).  ? Push the plunger up to remove the air bubbles from the syringe.  ? Pull back on the plunger once more to fill the syringe with the correct amount of medicine.  ? Repeat these steps until all air bubbles are gone.    Remove the needle from the bottle.    If your healthcare provider has told you to replace the needle with a new one, then do so as instructed.  Step 5. Select and prepare an injection site    Select a site that avoids major blood vessels and nerves. You were shown these sites in the hospital.    Each time you do an injection, change sites. Don't use the same site over and over. Don't use areas that are bruised, scarred, painful, or swollen.    Keep a written injection  record. Include the date, time, and site of each injection.    Clean the injection site with an alcohol pad and let it dry before injecting.    Remove the cap from the needle, if needed. Hold the syringe like a pencil. Use your other hand to stretch the skin flat around the injection site. But don t touch the actual injection site.    Step 6. Inject the medicine     Image 5       Insert the needle straight into the prepared injection site. Use a quick downward motion (Image 5).    Push down on the plunger at a steady rate. Inject all the medicine in the syringe.    Remove the needle by pulling the syringe and needle up and away from your body.    Release the stretched skin.    Press a 2-by-2-inch gauze pad onto the site.    Hold the pad tightly for a minute.    Check the area for redness, bleeding, or bruising.    Apply a bandage to the site, if needed.  Step 7. After the injection    Never recap a needle you have used for an injection.    Throw the needle and syringe away in your sharps container. Make sure the needle and syringe fit easily in the container and that the needles point down. Don't put your fingers into the container. When the container is full, follow the instructions for disposing of the container.    Put all other supplies in the trash.    Wash your hands again.    Medicine that comes in a container for a single dose should be used only 1 time. If you use the container a second time, it may have germs in it that can cause infections. These infections usually affect the skin and soft tissues. But some infections can affect the brain, spinal cord, or heart. Sharing another person's used needles or medicines can cause other infections such as hepatitis B and hepatitis C.   Follow-up care  Follow up with your healthcare provider, or as advised.  When to call your healthcare provider  Call your healthcare provider right away if you have any of the following:    Needle that breaks off in the injection  site    Problems that keep you from giving yourself the injection    Bleeding at the injection site that won t stop    Severe pain at the injection site    Medicine injected into the wrong area    Rash or swelling at the injection site    Shortness of breath    Fever of 100.4 F (38 C) or higher, or as advised by your healthcare provider  Sandrine last reviewed this educational content on 6/1/2019 2000-2021 [employer]. All rights reserved. This information is not intended as a substitute for professional medical care. Always follow your healthcare professional's instructions.      Choose the spot for the shot  Your thigh is a good place to give a shot to yourself because it's easy to see. A caregiver can also give you a shot here. Don't use this spot if you have any tenderness, hardness, redness or bruising there.   The photo shows a man injecting his left thigh.    The X's in the drawing show where to inject in either thigh.     In your mind, divide your thigh across into 3 equal parts. The shot will go in the outer middle third.              Patient Education     Discharge Instructions: Giving Yourself an Intramuscular (IM) Injection in the Thigh        Step 2. Finding an injection site  You will map out an area of your thigh as a long rectangle. You can give yourself an IM injection in several spots inside this rectangle. To do this:     Place the palm of your hand against the front of your thigh where it meets your groin. (If injecting into your right thigh, use your right hand. If injecting into your left thigh, use your left hand.) The area just below your hand is the top of the rectangle.    Next, place your palm over the top of your knee. The area just above your hand is the bottom of the rectangle.    Now imagine a line going down the middle of the front of your thigh. This is 1 long side of the rectangle.    Then imagine there is another line along the outside of your thigh. This is the  other long side of the rectangle.    Now imagine there is a line down the middle of your rectangle. (This line goes straight down from your hip to your knee.) Anywhere along this line is the best place to give the injection. (See the image above.)

## 2021-12-10 NOTE — CONFIDENTIAL NOTE
Received update from Dr. Carrillo who placed order for testosterone medication. Patient reviewed my chart message. Will close encounter at this time.    Bess Simmons RN, BSN

## 2021-12-10 NOTE — NURSING NOTE
Domingo Mccann comes into clinic today at the request of Dr. Carrillo Ordering Provider for Pt Teaching testosterone injection teaching for diagnosis of male hypogonadism.    With written and verbal instructions, patient was educated on self injection teaching. Patient verbalized understanding and successfully demonstrated drawing up of normal saline in syringe in clinic. Patient aware to complete labs 1 month after starting testosterone. Informed patient that a message will be sent to Dr. Carrillo to review and place order for testosterone medication. Per patient, he would like prescription sent to [facility] in [place]. Informed patient to call with any questions.    This service provided today was under the supervising provider of the day Dr. Hernandez, who was available if needed.    Bess Simmons RN

## 2021-12-13 ENCOUNTER — TELEPHONE (OUTPATIENT)
Dept: UROLOGY | Facility: CLINIC | Age: 41
End: 2021-12-13
Payer: COMMERCIAL

## 2021-12-13 NOTE — TELEPHONE ENCOUNTER
Prior Authorization Retail Medication Request    Medication/Dose: testosterone cypionate (DEPOTESTOSTERONE) 200 MG/ML injection  ICD code (if different than what is on RX):    Previously Tried and Failed:    Rationale:      Insurance Name:    Insurance ID:        Pharmacy Information (if different than what is on RX)  Name:    Phone:      Bess Simmons RN, BSN       Skin assessment done by myself and Esequiel KELLY. See charting.

## 2021-12-15 NOTE — TELEPHONE ENCOUNTER
Central Prior Authorization Team   Phone: [phone]      Unable to complete P/A request on Cover My Meds, as it is not able to process this request through ePA. I have downloaded a P/A form and will complete and fax in.    Formulary listing at   [email or web address]            PA Initiation    Medication: testosterone cypionate (DEPOTESTOSTERONE) 200 MG/ML injection  Insurance Company: [employer] - Phone [phone] Fax [phone]  Pharmacy Filling the Rx: [facility] #[id number] [place], [place] - [address]  Filling Pharmacy Phone: [phone]  Filling Pharmacy Fax:    Start Date: 12/15/2021

## 2021-12-20 NOTE — TELEPHONE ENCOUNTER
Prior Authorization Approval    Authorization Effective Date: 11/17/2021  Authorization Expiration Date: 6/15/2022  Medication: testosterone cypionate (DEPOTESTOSTERONE) 200 MG/ML injection  Approved Dose/Quantity: 30ml per 90 days  Reference #:     Insurance Company: [employer] - Phone [phone] Fax [phone]  Expected CoPay:        Which Pharmacy is filling the prescription (Not needed for infusion/clinic administered): [facility] #[id number] [address]  Pharmacy Notified: Yes  Patient Notified: No

## 2022-02-02 ENCOUNTER — LAB (OUTPATIENT)
Dept: LAB | Facility: CLINIC | Age: 42
End: 2022-02-02
Payer: COMMERCIAL

## 2022-02-02 DIAGNOSIS — E29.1 HYPOGONADISM MALE: ICD-10-CM

## 2022-02-02 LAB
HCT VFR BLD AUTO: 47.3 % (ref 40–53)
HGB BLD-MCNC: 16.3 G/DL (ref 13.3–17.7)
PSA SERPL-MCNC: 4.36 UG/L (ref 0–4)
SHBG SERPL-SCNC: 11 NMOL/L (ref 11–80)

## 2022-02-02 PROCEDURE — 85014 HEMATOCRIT: CPT

## 2022-02-02 PROCEDURE — 36415 COLL VENOUS BLD VENIPUNCTURE: CPT

## 2022-02-02 PROCEDURE — 84270 ASSAY OF SEX HORMONE GLOBUL: CPT

## 2022-02-02 PROCEDURE — 84153 ASSAY OF PSA TOTAL: CPT

## 2022-02-02 PROCEDURE — 85018 HEMOGLOBIN: CPT

## 2022-02-02 PROCEDURE — 84403 ASSAY OF TOTAL TESTOSTERONE: CPT

## 2022-02-05 LAB
TESTOST FREE SERPL-MCNC: 16.43 NG/DL
TESTOST SERPL-MCNC: 500 NG/DL (ref 240–950)

## 2022-03-16 DIAGNOSIS — E29.1 HYPOGONADISM MALE: ICD-10-CM

## 2022-03-16 NOTE — CONFIDENTIAL NOTE
testosterone cypionate 200 MG/ML    Last Written Prescription Date:  12/10/21  Last Fill Quantity: 10ML,   # refills: 0  Last Office Visit : 12/2/21  Future Office visit:  NONE  Routing refill request to provider for review/approval because:   Drug not on the refill protocol

## 2022-03-17 NOTE — TELEPHONE ENCOUNTER
Message sent to Dr. Carrillo with request to review and advise on refill request.    Bess Simmons RN, BSN

## 2022-03-21 ENCOUNTER — MYC MEDICAL ADVICE (OUTPATIENT)
Dept: UROLOGY | Facility: CLINIC | Age: 42
End: 2022-03-21
Payer: COMMERCIAL

## 2022-03-22 DIAGNOSIS — E29.1 HYPOGONADISM MALE: ICD-10-CM

## 2022-03-23 RX ORDER — TESTOSTERONE CYPIONATE 200 MG/ML
INJECTION, SOLUTION INTRAMUSCULAR
Qty: 10 ML
Start: 2022-03-23

## 2022-03-23 RX ORDER — TESTOSTERONE CYPIONATE 200 MG/ML
INJECTION, SOLUTION INTRAMUSCULAR
Qty: 10 ML | Refills: 3 | Status: SHIPPED | OUTPATIENT
Start: 2022-03-23 | End: 2022-11-18

## 2022-03-23 NOTE — TELEPHONE ENCOUNTER
Message sent to Dr. Linares who is in clinic today to review and advise if able to refill medication.    Bess Simmons RN, BSN

## 2022-05-24 ENCOUNTER — TELEPHONE (OUTPATIENT)
Dept: UROLOGY | Facility: CLINIC | Age: 42
End: 2022-05-24
Payer: COMMERCIAL

## 2022-05-24 ENCOUNTER — MYC MEDICAL ADVICE (OUTPATIENT)
Dept: UROLOGY | Facility: CLINIC | Age: 42
End: 2022-05-24
Payer: COMMERCIAL

## 2022-05-24 DIAGNOSIS — N52.9 ERECTILE DYSFUNCTION, UNSPECIFIED ERECTILE DYSFUNCTION TYPE: ICD-10-CM

## 2022-05-24 RX ORDER — TADALAFIL 5 MG/1
5 TABLET ORAL EVERY 24 HOURS
Qty: 30 TABLET | Refills: 5 | Status: SHIPPED | OUTPATIENT
Start: 2022-05-24 | End: 2022-11-20

## 2022-05-24 NOTE — CONFIDENTIAL NOTE
Medication request: tadalafil (CIALIS) 5 MG tablet  Sig: Take 1 tablet (5 mg) by mouth every 24 hours   Prescription written: 6/821  Last Qty: 30  Pt's last office visit: 12/2/21  Next scheduled office visit: none    Chart reviewed. Refill approved per refill protocol. Prescription sent to pharmacy. My chart message sent to patient.    Bess Simmons RN, BSN

## 2022-05-24 NOTE — TELEPHONE ENCOUNTER
Prior Authorization Retail Medication Request    Medication/Dose: testosterone cypionate (DEPOTESTOSTERONE) 200 MG/ML injection    Per 12/13/21 telephone encounter, current PA authorized through 6/15/22. Please complete PA for after 6/15/22.    ICD code (if different than what is on RX):    Previously Tried and Failed:    Rationale:      Insurance Name:    Insurance ID:        Pharmacy Information (if different than what is on RX)  Name:    Phone:        Bess Simmons RN, BSN

## 2022-05-31 NOTE — TELEPHONE ENCOUNTER
Central Prior Authorization Team - Phone: [phone]     PA Initiation    Medication: testosterone cypionate (DEPOTESTOSTERONE) 200 MG/ML injection  Insurance Company:    Pharmacy Filling the Rx: [facility] #[id number]  [place] - [address]  Filling Pharmacy Phone: [phone]  Filling Pharmacy Fax:    Start Date: 5/31/2022

## 2022-05-31 NOTE — TELEPHONE ENCOUNTER
Central Prior Authorization Team - Phone: [phone]     Prior Authorization Approval    Authorization Effective Date: 5/1/2022  Authorization Expiration Date: 5/31/2023  Medication: testosterone cypionate (DEPOTESTOSTERONE) 200 MG/ML injection- [place] APPROVED  Approved Dose/Quantity: 10  Reference #:     Insurance Company:    Expected CoPay:       CoPay Card Available:      Foundation Assistance Needed:    Which Pharmacy is filling the prescription (Not needed for infusion/clinic administered): [id number] [place], [place] - [address]  Pharmacy Notified: Yes  Patient Notified: YesComment:  PHARMACY WILL NOTIFY WHEN READY

## 2022-10-03 ENCOUNTER — HEALTH MAINTENANCE LETTER (OUTPATIENT)
Age: 42
End: 2022-10-03

## 2022-11-15 DIAGNOSIS — N52.9 ERECTILE DYSFUNCTION, UNSPECIFIED ERECTILE DYSFUNCTION TYPE: ICD-10-CM

## 2022-11-16 DIAGNOSIS — E29.1 HYPOGONADISM MALE: ICD-10-CM

## 2022-11-17 NOTE — TELEPHONE ENCOUNTER
testosterone cypionate (DEPOTESTOSTERONE) 200 MG/ML injection 10 mL 3 3/23/2022         Last Written Prescription Date:  3-  Last Fill Quantity: 10ML,   # refills: 3  Last Office Visit : 12-2-2021  Future Office visit:  NONE    Routing refill request to provider for review/approval because:  CONTROLLED MEDICATION

## 2022-11-18 RX ORDER — TESTOSTERONE CYPIONATE 200 MG/ML
INJECTION, SOLUTION INTRAMUSCULAR
Qty: 10 ML | Refills: 3 | Status: SHIPPED | OUTPATIENT
Start: 2022-11-18 | End: 2023-10-05

## 2022-11-20 RX ORDER — TADALAFIL 5 MG/1
5 TABLET ORAL DAILY
Qty: 30 TABLET | Refills: 1 | Status: SHIPPED | OUTPATIENT
Start: 2022-11-20 | End: 2023-01-18

## 2022-11-21 ENCOUNTER — MYC MEDICAL ADVICE (OUTPATIENT)
Dept: UROLOGY | Facility: CLINIC | Age: 42
End: 2022-11-21

## 2023-01-14 DIAGNOSIS — N52.9 ERECTILE DYSFUNCTION, UNSPECIFIED ERECTILE DYSFUNCTION TYPE: ICD-10-CM

## 2023-01-18 ENCOUNTER — TELEPHONE (OUTPATIENT)
Dept: UROLOGY | Facility: CLINIC | Age: 43
End: 2023-01-18
Payer: COMMERCIAL

## 2023-01-18 DIAGNOSIS — E29.1 HYPOGONADISM MALE: ICD-10-CM

## 2023-01-18 DIAGNOSIS — R97.20 ELEVATED PROSTATE SPECIFIC ANTIGEN (PSA): Primary | ICD-10-CM

## 2023-01-18 DIAGNOSIS — R79.89 LOW TESTOSTERONE: ICD-10-CM

## 2023-01-18 RX ORDER — TADALAFIL 5 MG/1
5 TABLET ORAL DAILY
Qty: 30 TABLET | Refills: 5 | Status: SHIPPED | OUTPATIENT
Start: 2023-01-18 | End: 2023-07-27

## 2023-01-18 NOTE — TELEPHONE ENCOUNTER
1/18 Called patient and left voicemail. Provided patient with [phone] to schedule follow up with Dr. Carrillo.     Crys valdez Procedure   Dermatology, Surgery, Urology  [facility] Surgery [facility]

## 2023-01-18 NOTE — TELEPHONE ENCOUNTER
Called and spoke to patient informing patient a message has been sent to the Dr. Carrillo regarding labs. Refill for Cialis was filled till patients appointment on 5/9/23. Patient informed Dr. Carrillo is in office at  on 1/19/23 and to expect a phone call back on that day.     Medication was filled in a separate encounter.     Ysabel Montejo LPN on 1/18/2023 at 1:32 PM

## 2023-01-18 NOTE — TELEPHONE ENCOUNTER
[facility] Call Center    Phone Message    May a detailed message be left on voicemail: yes     Reason for Call: Pt stated he usually gets blood work before appt. Please call pt to schedule if needed/once orders are placed.   Also, pt wondering if prescription can get refilled before appt 5/9    Thank you    Action Taken: Message routed to:  Other: Uro    Travel Screening: Not Applicable

## 2023-01-18 NOTE — TELEPHONE ENCOUNTER
Tadalafil Oral Tablet 5 MG  Last Written Prescription Date:   11/20/2022  Last Fill Quantity: 30,   # refills: 1  Last Office Visit :  12/2/2021  Future Office visit:  None    Routing refill request to provider for review/approval because:  Second Request  Over due office visit  Refer to scheduling/Provider for review       Radha Escalante RN  Central Triage Red Flags/Med Refills

## 2023-01-18 NOTE — TELEPHONE ENCOUNTER
Patient scheduled an appointment 5/9/23. Cialis 5 mg daily was refilled till this appointment date. [facility] in [place] was preferred pharmacy.    Ysabel Montejo LPN on 1/18/2023 at 1:35 PM

## 2023-01-20 NOTE — TELEPHONE ENCOUNTER
Orders placed for PSA, Testosterone Free and Total along with Hemoglobin and hematocrit. Patient is aware.     Ysabel Montejo LPN on 1/20/2023 at 2:33 PM

## 2023-02-14 DIAGNOSIS — E29.1 HYPOGONADISM MALE: ICD-10-CM

## 2023-02-17 RX ORDER — NEEDLES, DISPOSABLE 25GX5/8"
NEEDLE, DISPOSABLE MISCELLANEOUS
Qty: 50 EACH | Refills: 0 | Status: SHIPPED | OUTPATIENT
Start: 2023-02-17 | End: 2024-03-12

## 2023-02-17 RX ORDER — SYRINGE WITH NEEDLE, 1 ML 25GX5/8"
SYRINGE, EMPTY DISPOSABLE MISCELLANEOUS
Qty: 50 EACH | Refills: 0
Start: 2023-02-17

## 2023-02-17 NOTE — TELEPHONE ENCOUNTER
[facility] #9589 SYR/NDL 3ML [id number]/ LL  Last Written Prescription Date:  12-10-21  ()  Last Fill Quantity: 50,   # refills: 1  Last Office Visit : 21  Future Office visit:  23    Routing refill request to provider for review/approval because:  Rx not available per epic, need alternative              [facility] #5195 NEEDLES 18GX1  Last Written Prescription Date:  12-10-21  Last Fill Quantity: 50,   # refills: 1  Spoke w/ pharm rx , comes in boxes.  RF1 box

## 2023-04-24 ENCOUNTER — LAB (OUTPATIENT)
Dept: LAB | Facility: CLINIC | Age: 43
End: 2023-04-24
Payer: COMMERCIAL

## 2023-04-24 DIAGNOSIS — R97.20 ELEVATED PROSTATE SPECIFIC ANTIGEN (PSA): Primary | ICD-10-CM

## 2023-04-24 DIAGNOSIS — R97.20 ELEVATED PROSTATE SPECIFIC ANTIGEN (PSA): ICD-10-CM

## 2023-04-24 DIAGNOSIS — R79.89 LOW TESTOSTERONE: ICD-10-CM

## 2023-04-24 DIAGNOSIS — E29.1 HYPOGONADISM MALE: ICD-10-CM

## 2023-04-24 LAB
HCT VFR BLD AUTO: 48.5 % (ref 40–53)
HGB BLD-MCNC: 17 G/DL (ref 13.3–17.7)
PSA SERPL-MCNC: 4.53 UG/L (ref 0–4)
SHBG SERPL-SCNC: 12 NMOL/L (ref 11–80)

## 2023-04-24 PROCEDURE — 84403 ASSAY OF TOTAL TESTOSTERONE: CPT

## 2023-04-24 PROCEDURE — 36415 COLL VENOUS BLD VENIPUNCTURE: CPT

## 2023-04-24 PROCEDURE — 85018 HEMOGLOBIN: CPT

## 2023-04-24 PROCEDURE — 84153 ASSAY OF PSA TOTAL: CPT

## 2023-04-24 PROCEDURE — 84270 ASSAY OF SEX HORMONE GLOBUL: CPT

## 2023-04-24 PROCEDURE — 85014 HEMATOCRIT: CPT

## 2023-04-25 ENCOUNTER — TELEPHONE (OUTPATIENT)
Dept: UROLOGY | Facility: CLINIC | Age: 43
End: 2023-04-25

## 2023-04-25 ENCOUNTER — MYC MEDICAL ADVICE (OUTPATIENT)
Dept: UROLOGY | Facility: CLINIC | Age: 43
End: 2023-04-25
Payer: COMMERCIAL

## 2023-04-25 ENCOUNTER — TELEPHONE (OUTPATIENT)
Dept: UROLOGY | Facility: CLINIC | Age: 43
End: 2023-04-25
Payer: COMMERCIAL

## 2023-04-25 NOTE — TELEPHONE ENCOUNTER
Patient reviewed these results and recommendations via my chart on 4/25/23. Patient has MRI scheduled for 6/1/23.     Bess Simmons RN, BSN

## 2023-04-25 NOTE — TELEPHONE ENCOUNTER
4/25 Called patient and left voicemail. Provided patient with [phone] to schedule imaging ordered by Dr. Carrillo.     Crys valdez Procedure   Dermatology, Surgery, Urology  [facility] Surgery [facility]

## 2023-04-25 NOTE — TELEPHONE ENCOUNTER
"Per Dr. Carrillo, orders are placed    \"Hi team,     Given this change in PSA I would like to get an MRI. If this could be done before his 5/9 visit that would be ideal     Plan   - Prostate MRI     Thanks,   Kings Carrillo MD\"    Thank you,  Lizabeth Betancur LPN         "

## 2023-04-25 NOTE — TELEPHONE ENCOUNTER
Kings Carrillo MD  [facility] Urology Adult [facility] team,     Given this change in PSA I would like to get an MRI. If this could be done before his 5/9 visit that would be ideal     Plan   - Prostate MRI     Thanks,   Kings GRIFFIN. MD Gerardo     Received the 4/24/23 PSA results and the above result note from Dr. Carrillo. Prostate MRI order in place. Patient has reviewed the results, but needs update on result note from Dr. Carrillo.    Attempted to reach patient by phone, but no answer. Left generic message with request to return call to clinic and/or check my chart for an updated result note.     Bess Simmons RN, BSN

## 2023-04-26 LAB
TESTOST FREE SERPL-MCNC: 8.54 NG/DL
TESTOST SERPL-MCNC: 279 NG/DL (ref 240–950)

## 2023-04-28 NOTE — CONFIDENTIAL NOTE
Kings Carrillo MD  [facility] Urology Adult [place] 28 minutes ago (8:31 AM)     SRINIVAS Kellogg,     Lets reschedule his visit for after the MRI unless we are able to move up the date of the MRI.     Thanks,   Kings Carrillo M.D.      Received the above message from Dr. Carrillo. Per chart review, patient is scheduled for prostate MRI on 6/1/23 and has rescheduled his visit with Dr. Carrillo to 6/6/23.     Bess Simmons RN, BSN

## 2023-05-05 ENCOUNTER — TELEPHONE (OUTPATIENT)
Dept: UROLOGY | Facility: CLINIC | Age: 43
End: 2023-05-05
Payer: COMMERCIAL

## 2023-05-05 ENCOUNTER — MYC MEDICAL ADVICE (OUTPATIENT)
Dept: UROLOGY | Facility: CLINIC | Age: 43
End: 2023-05-05
Payer: COMMERCIAL

## 2023-05-05 NOTE — TELEPHONE ENCOUNTER
Prior Authorization Retail Medication Request    Medication/Dose: testosterone cypionate (DEPOTESTOSTERONE) 200 MG/ML injection  ICD code (if different than what is on RX):    Previously Tried and Failed:    Rationale:      Insurance Name:    Insurance ID:        Pharmacy Information (if different than what is on RX)  Name:    Phone:       Bess Simmons RN, BSN

## 2023-05-11 NOTE — TELEPHONE ENCOUNTER
Central Prior Authorization Team   Phone: [phone]      PA Initiation    Medication: testosterone cypionate (DEPOTESTOSTERONE) 200 MG/ML injection  Insurance Company: [employer] - Phone [phone] Fax [phone]  Pharmacy Filling the Rx: [facility] #[id number] - [place], [place] - [address]  Filling Pharmacy Phone: [phone]  Filling Pharmacy Fax:    Start Date: 5/11/2023

## 2023-05-11 NOTE — TELEPHONE ENCOUNTER
Prior Authorization Approval    Authorization Effective Date: 4/11/2023  Authorization Expiration Date: 5/10/2024  Medication: testosterone cypionate (DEPOTESTOSTERONE) 200 MG/ML injection  Approved Dose/Quantity:   Reference #:     Insurance Company: [employer] - Phone [phone] Fax [phone]  Expected CoPay:       CoPay Card Available:      Foundation Assistance Needed:    Which Pharmacy is filling the prescription (Not needed for infusion/clinic administered): [id number] [place], [place] - [address]  Pharmacy Notified: Yes  Patient Notified: No

## 2023-05-12 NOTE — TELEPHONE ENCOUNTER
Called and spoke to patient who is aware of the prior authorization approval. Patient verbalized understanding and was grateful for the call.    Bess Simmons RN, BSN

## 2023-06-01 ENCOUNTER — ANCILLARY PROCEDURE (OUTPATIENT)
Dept: MRI IMAGING | Facility: CLINIC | Age: 43
End: 2023-06-01
Attending: UROLOGY
Payer: COMMERCIAL

## 2023-06-01 DIAGNOSIS — R97.20 ELEVATED PROSTATE SPECIFIC ANTIGEN (PSA): ICD-10-CM

## 2023-06-01 PROCEDURE — A9585 GADOBUTROL INJECTION: HCPCS | Performed by: RADIOLOGY

## 2023-06-01 PROCEDURE — 72197 MRI PELVIS W/O & W/DYE: CPT | Performed by: RADIOLOGY

## 2023-06-01 RX ORDER — GADOBUTROL 604.72 MG/ML
10 INJECTION INTRAVENOUS ONCE
Status: COMPLETED | OUTPATIENT
Start: 2023-06-01 | End: 2023-06-01

## 2023-06-01 RX ADMIN — GADOBUTROL 10 ML: 604.72 INJECTION INTRAVENOUS at 08:50

## 2023-06-01 NOTE — DISCHARGE INSTRUCTIONS
MRI Contrast Discharge Instructions    The IV contrast you received today will pass out of your body in your  urine. This will happen in the next 24 hours. You will not feel this process.  Your urine will not change color.    Drink at least 4 extra glasses of water or juice today (unless your doctor  has restricted your fluids). This reduces the stress on your kidneys.  You may take your regular medicines.    If you are on dialysis: It is best to have dialysis today.    If you have a reaction: Most reactions happen right away. If you have  any new symptoms after leaving the hospital (such as hives or swelling),  call your hospital at the correct number below. Or call your family doctor.  If you have breathing distress or wheezing, call 911.    Special instructions: ***    I have read and understand the above information.    Signature:______________________________________ Date:___________    Staff:__________________________________________ Date:___________     Time:__________    [place] [facility] Departments:    ___[place]: [phone]  ___[facility]: [phone]  ___[place]: [phone] ___[facility]: [phone]  ___[facility]: [phone]  ___[facility]: [phone]  ___[phone]  ___[facility]: [phone]  ___[phone]

## 2023-06-06 ENCOUNTER — VIRTUAL VISIT (OUTPATIENT)
Dept: UROLOGY | Facility: CLINIC | Age: 43
End: 2023-06-06
Payer: COMMERCIAL

## 2023-06-06 DIAGNOSIS — N40.1 BPH WITH OBSTRUCTION/LOWER URINARY TRACT SYMPTOMS: ICD-10-CM

## 2023-06-06 DIAGNOSIS — R97.20 ELEVATED PROSTATE SPECIFIC ANTIGEN (PSA): Primary | ICD-10-CM

## 2023-06-06 DIAGNOSIS — R79.89 LOW TESTOSTERONE: ICD-10-CM

## 2023-06-06 DIAGNOSIS — N13.8 BPH WITH OBSTRUCTION/LOWER URINARY TRACT SYMPTOMS: ICD-10-CM

## 2023-06-06 DIAGNOSIS — E29.1 HYPOGONADISM MALE: ICD-10-CM

## 2023-06-06 PROCEDURE — 99214 OFFICE O/P EST MOD 30 MIN: CPT | Mod: VID | Performed by: UROLOGY

## 2023-06-06 NOTE — PROGRESS NOTES
[facility]   CHIEF COMPLAINT   It was my pleasure to see Domingo Mccann who is a 43 year old male for follow-up of low libido, family history of prostate cancer, post Covid.      HPI   Domingo Mccann is a very pleasant 43 year old male   Initially seen 6/8/21:  Domingo Mccann is a 41 year old male who is being seen for evaluation of Erectile dysfunction      He recently had COVID-19 7-8 months ago and has been having post COVID issues  Since COVID his sex drive has been decreased  He had been using sildenafil 20mg tablets and he had been using 40mg  If he tried 60mg then he has bothersome side effects  He has been on sildenafil for the past 5 years     Father did have prostate cancer diagnosed around age 73 and underwent radiation therapy    7/29/21:  Follow-up today to discuss his testosterone and PSA results  He continues to have decreased overall energy level and decreased libido  He is also having some lower extremity numbness and tingling since his COVID-19 and is seeing neurology for this    12/2/21:  He has been doing ok  Continues to have fatigue  He does note that the Cialis 5 mg daily is generally working well, though at times his erection is not as firm as he would like and he is wondering if there is anything stronger    TODAY 6/6/2023:  Follow-up today for discussion of his PSA and MRI  He continues on testosterone replacement which has been helpful for his low libido and low energy level    PHYSICAL EXAM  Patient is a 43 year old  male   Vitals: There were no vitals taken for this visit.  There is no height or weight on file to calculate BMI.  General Appearance Adult:   Alert, no acute distress, oriented  HENT: throat/mouth:normal, good dentition  Lungs: no respiratory distress, or pursed lip breathing  Heart: No obvious jugular venous distension present  Abdomen: non - distended  Musculoskeltal: extremities normal, no peripheral edema  Skin: no suspicious lesions or rashes  Neuro: Alert,  oriented, speech and mentation normal  Psych: affect and mood normal       PSA   Latest Ref Rng 0.00 - 4.00 ug/L   6/11/2021 3.13    11/29/2021 3.04    2/2/2022 4.36 (H)    4/24/2023 4.53 (H)          Testosterone Total Sex Hormone Binding Globulin Free Testosterone Calculated   Latest Ref Rng 240 - 950 ng/dL 11 - 80 nmol/L ng/dL   6/11/2021 208 (L)  12  6.24    6/22/2021 187 (L)  11  5.73    11/29/2021 125 (L)  11  3.77    2/2/2022 500  11  16.43    4/24/2023 279  12  8.54         MRI PROSTATE 6/1/2023:  FINDINGS:  Size: 49 grams  Hemorrhage: Absent  Peripheral zone: Homogeneously hypointense on T2-weighted images.  Suspicious lesions as detailed below.  Transition zone: Enlarged with BPH changes. Transition zone nodules  which are circumscribed or mostly encapsulated without diffusion  restriction.  PI-RADS 2.  No highly suspicious nodules.     Lesion(s) in rank order of severity (highest score- to lowest score,  then by size)      Lesion 1:  Location: Right mid gland peripheral zone at the 8 o'clock position  relative to the urethra. Series 5 image 59.   Additional prostate regions involved: None  Size: 5 mm  T2 description: No discrete T2 signal is appreciated.  T2 numerical assessment: 2  DWI description: Restricted on high value DWI, mildly hypointense on  ADC map  DWI numerical assessment: 3  DCE assessment: Negative    Prostate margin: Capsular abutment<6 mm  Lesion overall PI-RADS category: 3     Neurovascular bundles: No neurovascular bundle involvement by  malignancy.  Seminal vesicles: No seminal vesicle involvement by malignancy.  Lymph nodes: No lymph node involvement  Bones: No suspicious lesions  Other pelvic organs: Sigmoid diverticulosis. Atrophic appearance of  testicles. Small fat-containing left inguinal hernia.                                                                   IMPRESSION:  1. Based on the most suspicious abnormality, this exam is  characterized as PIRADS 3 - The presence of  clinically significant  cancer is equivocal.  The most suspicious abnormality is located at  the right mid gland peripheral zone and there is minimal capsular  abutment with no convincing evidence of extraprostatic extension. Of  note, diffuse T2 hypointense appearance of peripheral zone potentially  may indicate prostatitis.  2. No suspicious adenopathy or evidence of pelvic metastases.      ASSESSMENT and PLAN  43-year-old man with history of COVID-19 about 9 months ago with some post Covid changes including decreased overall energy, decreased sex drive, and some numbness and tingling.  Also with family history of prostate cancer    Elevated PSA with family history of prostate cancer  - I reviewed his PSA history and trend  - We discussed the impact of testosterone replacement on the PSA  - I reviewed his MRI and reviewed the images personally.  I agree with radiologist interpretation.  We discussed the PI-RADS scoring system and that a PI-RADS 3 lesion will correlate with clinically significant prostate cancer about 50% of the time  - We discussed the options for continued close monitoring of his PSA versus proceeding with a prostate biopsy at this point  -TRUS/Bx - we discussed the risks and benefits of the prostate biopsy including the normal intermittent hematuria, blood per rectum, and blood with ejaculation as well as a 1-2% risk of post biopsy sepsis requiring hospitalization and IV antibiotics  -He would prefer a PSA recheck in 3 months and I will send him the results on [place] [employer].  We discussed that if his PSA rises further we will plan for an MR fusion UroNav biopsy at my [facility] office and I will send prescription for antibiotics at that time    Hypogonadism  -I reviewed his labs which are notable for a testosterone in the normal range  - We will plan for continued supplementation at this time    Erectile dysfunction  -We discussed that he may add an additional 5 mg dose of Cialis on days when sexual  activity as planned      Time spent: 20 minutes spent on the date of the encounter doing chart review, history and exam, documentation and further activities as noted above.    Kings Carrillo MD   Urology  [facility]  [facility] Phone: [phone]  [facility] Phone: [phone]      Domingo Mccann  who is being evaluated via a billable video visit.      How would you like to obtain your AVS? MyChart  If the video visit is dropped, the invitation should be resent by: Text to cell phone: [phone]  Will anyone else be joining your video visit? No        Virtual Visit Details    Type of service:  Video Visit     Start time: 1:26 PM    End time: 1:35 PM    Originating Location (pt. Location): Home    Distant Location (provider location):  On-site  Platform used for Video Visit: DemarWell

## 2023-06-06 NOTE — NURSING NOTE
Is the patient currently in the state of [place]? YES    Visit mode:VIDEO    If the visit is dropped, the patient can be reconnected by: VIDEO VISIT: Text to cell phone: [phone]    Will anyone else be joining the visit? NO      How would you like to obtain your AVS? MyChart    Are changes needed to the allergy or medication list? NO    Reason for visit: Consult (Low libido follow up )

## 2023-06-07 ENCOUNTER — TELEPHONE (OUTPATIENT)
Dept: UROLOGY | Facility: CLINIC | Age: 43
End: 2023-06-07
Payer: COMMERCIAL

## 2023-06-07 NOTE — TELEPHONE ENCOUNTER
Left Voicemail (1st Attempt) for the patient to call back and schedule the following:    Appointment type: Lab  Return date: 3 months  Specialty phone number: [phone]  Additonal Notes: PSA Lab only in 3 months.     Crys valdez Procedure   Dermatology, Surgery, Urology  [facility]

## 2023-07-24 DIAGNOSIS — N52.9 ERECTILE DYSFUNCTION, UNSPECIFIED ERECTILE DYSFUNCTION TYPE: ICD-10-CM

## 2023-07-27 RX ORDER — TADALAFIL 5 MG/1
5 TABLET ORAL DAILY
Qty: 30 TABLET | Refills: 10 | Status: SHIPPED | OUTPATIENT
Start: 2023-07-27 | End: 2024-03-12

## 2023-07-27 NOTE — TELEPHONE ENCOUNTER
tadalafil (CIALIS) 5 MG tablet     Last Written Prescription Date:  01-  Last Fill Quantity: 30,   # refills: 5  Last Office Visit : 6-6-2023  Future Office visit:  none

## 2023-09-01 ENCOUNTER — LAB (OUTPATIENT)
Dept: LAB | Facility: CLINIC | Age: 43
End: 2023-09-01
Payer: COMMERCIAL

## 2023-09-01 DIAGNOSIS — R97.20 ELEVATED PROSTATE SPECIFIC ANTIGEN (PSA): ICD-10-CM

## 2023-09-01 LAB — PSA SERPL DL<=0.01 NG/ML-MCNC: 4.09 NG/ML (ref 0–2.5)

## 2023-09-01 PROCEDURE — 84153 ASSAY OF PSA TOTAL: CPT

## 2023-09-01 PROCEDURE — 36415 COLL VENOUS BLD VENIPUNCTURE: CPT

## 2023-09-08 ENCOUNTER — MYC MEDICAL ADVICE (OUTPATIENT)
Dept: UROLOGY | Facility: CLINIC | Age: 43
End: 2023-09-08
Payer: COMMERCIAL

## 2023-09-08 DIAGNOSIS — R97.20 ELEVATED PROSTATE SPECIFIC ANTIGEN (PSA): Primary | ICD-10-CM

## 2023-09-08 NOTE — CONFIDENTIAL NOTE
Kings Carrillo MD  [facility] Urology Adult [facility] Domingo,    Here is the result of your updated PSA.  This is reassuring with a decrease to 4.09.  I would recommend continued surveillance at this time.    Plan  -Follow-up in 6 months with a repeat PSA    Thanks,  Kings GRIFFIN. MD Gerardo    Received the 9/1/23 PSA results and the above result note from Dr. Carrillo. Patient reviewed results and recommendations via my chart on 9/7/23. Future PSA lab ordered per Dr. Carrillo. My chart with scheduling information sent to patient.    Bess Simmons RN, BSN      
will continue to assess functional status to determine appropriate DME for discharge

## 2023-09-27 DIAGNOSIS — E29.1 HYPOGONADISM MALE: ICD-10-CM

## 2023-09-28 NOTE — TELEPHONE ENCOUNTER
TESTOSTERONE CYP 200MG/ML SDV 1ML   Last Written Prescription Date:   11/18/2022  Last Fill Quantity: 10,   # refills: 3  Last Office Visit :  6/6/2023  Future Office visit:  None    Routing refill request to provider for review/approval because:  Drug not on the [facility], [facility] or [facility] refill protocol or controlled substance    Radha Escalante RN  Central Triage Red Flags/Med Refills

## 2023-10-04 DIAGNOSIS — E29.1 HYPOGONADISM MALE: ICD-10-CM

## 2023-10-05 RX ORDER — TESTOSTERONE CYPIONATE 200 MG/ML
100 INJECTION, SOLUTION INTRAMUSCULAR WEEKLY
Qty: 10 ML | Refills: 5 | Status: SHIPPED | OUTPATIENT
Start: 2023-10-05

## 2023-10-05 RX ORDER — TESTOSTERONE CYPIONATE 200 MG/ML
INJECTION, SOLUTION INTRAMUSCULAR
Qty: 10 ML | Refills: 5 | Status: SHIPPED | OUTPATIENT
Start: 2023-10-05 | End: 2024-03-12

## 2023-10-05 NOTE — TELEPHONE ENCOUNTER
testosterone cypionate (DEPOTESTOSTERONE) 200 MG/ML injection     10 mL 3 11/18/2022       10/4/2023  [facility] Kings Rollins MD  Urology    Routed because:: controlled

## 2023-10-21 ENCOUNTER — HEALTH MAINTENANCE LETTER (OUTPATIENT)
Age: 43
End: 2023-10-21

## 2024-03-11 ENCOUNTER — LAB (OUTPATIENT)
Dept: LAB | Facility: CLINIC | Age: 44
End: 2024-03-11
Payer: COMMERCIAL

## 2024-03-11 DIAGNOSIS — R97.20 ELEVATED PROSTATE SPECIFIC ANTIGEN (PSA): ICD-10-CM

## 2024-03-11 LAB — PSA SERPL DL<=0.01 NG/ML-MCNC: 3.48 NG/ML (ref 0–2.5)

## 2024-03-11 PROCEDURE — 84153 ASSAY OF PSA TOTAL: CPT

## 2024-03-11 PROCEDURE — 36415 COLL VENOUS BLD VENIPUNCTURE: CPT

## 2024-03-12 ENCOUNTER — VIRTUAL VISIT (OUTPATIENT)
Dept: UROLOGY | Facility: CLINIC | Age: 44
End: 2024-03-12
Attending: UROLOGY
Payer: COMMERCIAL

## 2024-03-12 DIAGNOSIS — E29.1 HYPOGONADISM MALE: ICD-10-CM

## 2024-03-12 DIAGNOSIS — N52.9 ERECTILE DYSFUNCTION, UNSPECIFIED ERECTILE DYSFUNCTION TYPE: ICD-10-CM

## 2024-03-12 DIAGNOSIS — R97.20 ELEVATED PROSTATE SPECIFIC ANTIGEN (PSA): Primary | ICD-10-CM

## 2024-03-12 PROCEDURE — 99214 OFFICE O/P EST MOD 30 MIN: CPT | Mod: 95 | Performed by: UROLOGY

## 2024-03-12 RX ORDER — NEEDLES, DISPOSABLE 25GX5/8"
NEEDLE, DISPOSABLE MISCELLANEOUS
Qty: 50 EACH | Refills: 11 | Status: SHIPPED | OUTPATIENT
Start: 2024-03-12

## 2024-03-12 RX ORDER — TESTOSTERONE CYPIONATE 200 MG/ML
INJECTION, SOLUTION INTRAMUSCULAR
Qty: 10 ML | Refills: 5 | Status: SHIPPED | OUTPATIENT
Start: 2024-03-12

## 2024-03-12 RX ORDER — TADALAFIL 5 MG/1
5 TABLET ORAL DAILY
Qty: 30 TABLET | Refills: 11 | Status: SHIPPED | OUTPATIENT
Start: 2024-03-12

## 2024-03-12 NOTE — PROGRESS NOTES
[facility]   CHIEF COMPLAINT   It was my pleasure to see Domingo Mccann who is a 43 year old male for follow-up of low libido, family history of prostate cancer, post Covid.      HPI   Domingo Mccann is a very pleasant 43 year old male   Initially seen 6/8/21:  Domingo Mccann is a 41 year old male who is being seen for evaluation of Erectile dysfunction      He recently had COVID-19 7-8 months ago and has been having post COVID issues  Since COVID his sex drive has been decreased  He had been using sildenafil 20mg tablets and he had been using 40mg  If he tried 60mg then he has bothersome side effects  He has been on sildenafil for the past 5 years     Father did have prostate cancer diagnosed around age 73 and underwent radiation therapy    7/29/21:  Follow-up today to discuss his testosterone and PSA results  He continues to have decreased overall energy level and decreased libido  He is also having some lower extremity numbness and tingling since his COVID-19 and is seeing neurology for this    12/2/21:  He has been doing ok  Continues to have fatigue  He does note that the Cialis 5 mg daily is generally working well, though at times his erection is not as firm as he would like and he is wondering if there is anything stronger    6/6/2023:  Follow-up today for discussion of his PSA and MRI  He continues on testosterone replacement which has been helpful for his low libido and low energy level    TODAY 3/12/2024:  He has been doing well otherwise   He is on testosterone replacement and injects on Sunday and he does note some change in symptoms on Friday and Saturday  He is on tadalafil (Cialis) 5mg daily with fairly good response, though he is wondering if there is additional dosing that he could take as needed    PHYSICAL EXAM  Patient is a 43 year old  male   Vitals: There were no vitals taken for this visit.  There is no height or weight on file to calculate BMI.  General Appearance Adult:   Alert, no acute  distress, oriented  Neuro: Alert, oriented, speech and mentation normal  Psych: affect and mood normal        PSA PSA Tumor Marker   Latest Ref Rng 0.00 - 4.00 ug/L 0.00 - 2.50 ng/mL   6/11/2021 3.13     11/29/2021 3.04     2/2/2022 4.36 (H)     4/24/2023 4.53 (H)     9/1/2023  4.09 (H)    3/11/2024  3.48 (H)       Testosterone Total Sex Hormone Binding Globulin Free Testosterone Calculated   Latest Ref Rng 240 - 950 ng/dL 11 - 80 nmol/L ng/dL   6/11/2021 208 (L)  12  6.24    6/22/2021 187 (L)  11  5.73    11/29/2021 125 (L)  11  3.77    2/2/2022 500  11  16.43    4/24/2023 279  12  8.54         MRI PROSTATE 6/1/2023:  FINDINGS:  Size: 49 grams  Hemorrhage: Absent  Peripheral zone: Homogeneously hypointense on T2-weighted images.  Suspicious lesions as detailed below.  Transition zone: Enlarged with BPH changes. Transition zone nodules  which are circumscribed or mostly encapsulated without diffusion  restriction.  PI-RADS 2.  No highly suspicious nodules.     Lesion(s) in rank order of severity (highest score- to lowest score,  then by size)      Lesion 1:  Location: Right mid gland peripheral zone at the 8 o'clock position  relative to the urethra. Series 5 image 59.   Additional prostate regions involved: None  Size: 5 mm  T2 description: No discrete T2 signal is appreciated.  T2 numerical assessment: 2  DWI description: Restricted on high value DWI, mildly hypointense on  ADC map  DWI numerical assessment: 3  DCE assessment: Negative    Prostate margin: Capsular abutment<6 mm  Lesion overall PI-RADS category: 3     Neurovascular bundles: No neurovascular bundle involvement by  malignancy.  Seminal vesicles: No seminal vesicle involvement by malignancy.  Lymph nodes: No lymph node involvement  Bones: No suspicious lesions  Other pelvic organs: Sigmoid diverticulosis. Atrophic appearance of  testicles. Small fat-containing left inguinal hernia.                                                                    IMPRESSION:  1. Based on the most suspicious abnormality, this exam is  characterized as PIRADS 3 - The presence of clinically significant  cancer is equivocal.  The most suspicious abnormality is located at  the right mid gland peripheral zone and there is minimal capsular  abutment with no convincing evidence of extraprostatic extension. Of  note, diffuse T2 hypointense appearance of peripheral zone potentially  may indicate prostatitis.  2. No suspicious adenopathy or evidence of pelvic metastases.      ASSESSMENT and PLAN  43-year-old man with history of symptomatic hypogonadism, prior elevated PSA with family history of prostate cancer    Elevated PSA with family history of prostate cancer  -Again reviewed his PSA history and trend  - His most recent PSA is very reassuring at 3.48  - Again reviewed his prior prostate MRI with a prostate size of 49 g making his PSA density less than 0.1 which is reassuring  - Will plan for continued surveillance and follow-up in 1 year with a PSA    Hypogonadism  -I reviewed his labs which are notable for a testosterone in the normal range  - We will plan for continued supplementation at this time  - Follow-up in 1 year with repeat labs    Erectile dysfunction  -We discussed that he may add an additional 5 mg dose of Cialis on days when sexual activity as planned  - Refill prescription sent to the pharmacy        Time spent: 20 minutes spent on the date of the encounter doing chart review, history and exam, documentation and further activities as noted above.    Kings Carrillo MD   Urology  [facility]  [facility] Phone: [phone]  [facility] Phone: [phone]      Domingo Mccann  who is being evaluated via a billable video visit.      How would you like to obtain your AVS? MyChart  If the video visit is dropped, the invitation should be resent by: Text to cell phone: [phone]  Will anyone else  be joining your video visit? No        Virtual Visit Details    Type of service:  Video Visit     Start time: 3:50 PM    End time: 4:00 PM     Originating Location (pt. Location): Home    Distant Location (provider location):  On-site  Platform used for Video Visit: Ramirez

## 2024-03-13 ENCOUNTER — TELEPHONE (OUTPATIENT)
Dept: UROLOGY | Facility: CLINIC | Age: 44
End: 2024-03-13
Payer: COMMERCIAL

## 2024-03-13 NOTE — TELEPHONE ENCOUNTER
Left Voicemail (1st Attempt) for the patient to call back and schedule the following:    Appointment type: Return  Provider: Dr. Carrillo  Return date: 3/12/2025  Specialty phone number: [phone]  Additional appointment(s) needed: PSA 2-3 days prior.   Additonal Notes: 1 year follow up, PSA prior.     [facility]   Dermatology, Surgery, Urology  [facility]

## 2024-03-13 NOTE — TELEPHONE ENCOUNTER
[facility] Call Center    Phone Message    May a detailed message be left on voicemail: no     Reason for Call: Other: Please place PSA lab order per Dr. Carrillo. Thank you.     Action Taken: [facility]    Travel Screening: Not Applicable

## 2024-03-18 ENCOUNTER — MYC MEDICAL ADVICE (OUTPATIENT)
Dept: UROLOGY | Facility: CLINIC | Age: 44
End: 2024-03-18
Payer: COMMERCIAL

## 2024-03-19 ENCOUNTER — TELEPHONE (OUTPATIENT)
Dept: UROLOGY | Facility: CLINIC | Age: 44
End: 2024-03-19
Payer: COMMERCIAL

## 2024-03-19 NOTE — TELEPHONE ENCOUNTER
Prior Authorization Retail Medication Request     Medication/Dose: testosterone cypionate (DEPOTESTOSTERONE) 200 MG/ML injection  ICD code (if different than what is on RX):    Previously Tried and Failed:    Rationale:       Insurance Name:    Insurance ID:          Pharmacy Information (if different than what is on RX)  Name:    Phone:     Yvette Sinclair RN

## 2024-03-19 NOTE — TELEPHONE ENCOUNTER
Note: Due to record-high volumes, our turn-around time is taking longer than usual . We are currently 10 business days behind in the pools.   We are working diligently to submit all requests in a timely manner and in the order they are received. Please only flag TRUE URGENT requests as high priority to the pool at this time.   If you have questions - please send a note/message in the active PA encounter and send back to the RPPA (Retail Pharmacy Prior Authorization) team [[id number]].    If you have more specific questions about our process please reach out to our supervisor Marium Torres.   Thank you!        Insurance Prior Approval Document  Received: Today   Yvette Lechuga RN  P [facility] [place]  Cc: P [facility]  Phone Number: [phone]            Previous Messages    Insurance Prior Approval Document  (Newest Message First)  View All Conversations on this Encounter  Yvette Sinclair RN  [facility] Adult [place]; [facility]9 minutes ago (9:35 AM)     LR  Hello, could you please work on extending this prior auth for testosterone cypionate (DEPOTESTOSTERONE) 200 MG/ML injection?  Thank you, Yvette Willett RN11 minutes ago (9:34 AM)          Prior Authorization Retail Medication Request     Medication/Dose: testosterone cypionate (DEPOTESTOSTERONE) 200 MG/ML injection  ICD code (if different than what is on RX):    Previously Tried and Failed:    Rationale:       Insurance Name:    Insurance ID:          Pharmacy Information (if different than what is on RX)  Name:    Phone:      Yvette Sinclair RN          Note     MARQUIS Willett1 minutes ago (9:34 AM)     LR  Good [address],     We will send for an extension of the prior approval.  We will update you as soon as we receive a response from our prior authorization team.    Thank you,  Your Urology Care Team       This MyChart message has not been read.     Domingo ONEAL [facility]  Urology Adult [place] (supporting Kings Carrillo MD)16 hours ago (5:31 PM)     NB  Dr. Carrillo and Team, I'm attaching an RX Prior Approval Document (See Attached) that was sent to me from my Insurance Company. It appears that I will need your prior approval to continue with the prescribed RX. Thank you for your time with this matter.     Regards, Regan     [phone]  [phone]  [email or web address]   Anastacio Mccann Prior Approval Doc..pdf

## 2024-03-29 NOTE — TELEPHONE ENCOUNTER
Retail Pharmacy Prior Authorization Team   Phone: [phone]    PA Initiation    Med[facility]: [facility]  Insurance Company: [employer] - Phone [phone] Fax [phone]  Pharmacy Filling the Rx: [facility] #[id number] [place], [place] - [address]  Filling Pharmacy Phone: [phone]  Filling Pharmacy Fax:    Start Date: 3/29/2024

## 2024-03-29 NOTE — TELEPHONE ENCOUNTER
Prior Authorization Approval    Authorization Effective Date: 2/28/2024  Authorization Expiration Date: 3/29/2025  Medication: testosterone cypionate (DEPOTESTOSTERONE) 200 MG/ML injection-APPROVED  Reference #:     Insurance Company: [employer] - Phone [phone] Fax [phone]  Which Pharmacy is filling the prescription (Not needed for infusion/clinic administered): [facility] #[id number] - [place], [place] - [address]  Pharmacy Notified: Yes  Patient Notified: Instructed pharmacy to notify patient when script is ready to /ship.

## 2024-04-30 RX ORDER — SYRINGE WITH NEEDLE, 1 ML 25GX5/8"
SYRINGE, EMPTY DISPOSABLE MISCELLANEOUS
OUTPATIENT
Start: 2024-04-30

## 2024-11-04 DIAGNOSIS — E29.1 HYPOGONADISM MALE: ICD-10-CM

## 2024-11-05 NOTE — TELEPHONE ENCOUNTER
Medication Requested:   Disp Refills Start End GAVI[facility]   testosterone cypionate (DEPOTESTOSTERONE) 200 MG/ML injection 10 mL 5 3/12/2024 -- No   Sig: INJECT 0.5 MLS INTO THE MUSCLE ONCE A WEEK     ----------------------  Last Office Visit : 3/12/2024  [facility] Office visit:     3/4/2025 3:00 PM (15 min)  [place]   Arrive by:  2:45 PM   RETURN PATIENT    [facility] ([place])   Kings Carrillo MD     ----------------------        Refill decision: Refill pended and routed to the provider for review/determination due to the following criteria not met:     Medication not on [facility], [facility], [facility] refill protocol

## 2024-11-07 RX ORDER — TESTOSTERONE CYPIONATE 200 MG/ML
INJECTION, SOLUTION INTRAMUSCULAR
Qty: 10 ML | Refills: 5 | Status: SHIPPED | OUTPATIENT
Start: 2024-11-07

## 2024-12-14 ENCOUNTER — HEALTH MAINTENANCE LETTER (OUTPATIENT)
Age: 44
End: 2024-12-14

## 2025-02-17 ENCOUNTER — TELEPHONE (OUTPATIENT)
Dept: UROLOGY | Facility: CLINIC | Age: 45
End: 2025-02-17
Payer: COMMERCIAL

## 2025-02-17 NOTE — TELEPHONE ENCOUNTER
Patient is scheduled for an upcoming appointment with Dr. Carrillo on 3/4/25.     Per last note patient was to: 1 year follow up with repeat labs.    Orders placed: Testosterone, CBC and PSA lab order current.     Patient is scheduled 2/28/28 to complete lab/imaging.     Routing to the inbasket to ensure patient completes lab/imaging    Crys KeeneMarina on 2/17/2025 at 3:09 PM      Future Appointments 2/17/2025 - 8/16/2025        Date Visit Type Length Department Provider     2/28/2025  8:00 AM LAB 15 min [facility] FIRST FLOOR [facility]    Location Instructions:     The clinic is located at [address] in [place].&nbsp; We offer free parking in our on-site lot.              3/4/2025  3:00 PM RETURN PATIENT 15 min [facility] Kings Carrillo MD

## 2025-03-04 ENCOUNTER — VIRTUAL VISIT (OUTPATIENT)
Dept: UROLOGY | Facility: CLINIC | Age: 45
End: 2025-03-04
Payer: COMMERCIAL

## 2025-03-04 DIAGNOSIS — R79.89 LOW TESTOSTERONE: ICD-10-CM

## 2025-03-04 DIAGNOSIS — N52.9 ERECTILE DYSFUNCTION, UNSPECIFIED ERECTILE DYSFUNCTION TYPE: ICD-10-CM

## 2025-03-04 DIAGNOSIS — E29.1 HYPOGONADISM MALE: Primary | ICD-10-CM

## 2025-03-04 DIAGNOSIS — R97.20 ELEVATED PROSTATE SPECIFIC ANTIGEN (PSA): ICD-10-CM

## 2025-03-04 RX ORDER — TADALAFIL 5 MG/1
5 TABLET ORAL DAILY
Qty: 30 TABLET | Refills: 11 | Status: SHIPPED | OUTPATIENT
Start: 2025-03-04

## 2025-03-04 NOTE — PROGRESS NOTES
[facility]   CHIEF COMPLAINT   It was my pleasure to see Domingo Mccann who is a 44 year old male for follow-up of low libido, family history of prostate cancer, post Covid.      HPI   Domingo Mccann is a very pleasant 44 year old male   Initially seen 6/8/21:  Domingo Mccann is a 41 year old male who is being seen for evaluation of Erectile dysfunction      He recently had COVID-19 7-8 months ago and has been having post COVID issues  Since COVID his sex drive has been decreased  He had been using sildenafil 20mg tablets and he had been using 40mg  If he tried 60mg then he has bothersome side effects  He has been on sildenafil for the past 5 years     Father did have prostate cancer diagnosed around age 73 and underwent radiation therapy    7/29/21:  Follow-up today to discuss his testosterone and PSA results  He continues to have decreased overall energy level and decreased libido  He is also having some lower extremity numbness and tingling since his COVID-19 and is seeing neurology for this    12/2/21:  He has been doing ok  Continues to have fatigue  He does note that the Cialis 5 mg daily is generally working well, though at times his erection is not as firm as he would like and he is wondering if there is anything stronger    6/6/2023:  Follow-up today for discussion of his PSA and MRI  He continues on testosterone replacement which has been helpful for his low libido and low energy level    3/12/2024:  He has been doing well otherwise   He is on testosterone replacement and injects on Sunday and he does note some change in symptoms on Friday and Saturday  He is on tadalafil (Cialis) 5mg daily with fairly good response, though he is wondering if there is additional dosing that he could take as needed    TODAY 3/4/2025:  He has been doing well this past year  He has not noted any changes  He is doing well with energy level with current testosterone dose    PHYSICAL EXAM  Patient is a 44 year old  male    Vitals: There were no vitals taken for this visit.  There is no height or weight on file to calculate BMI.  General Appearance Adult:   Alert, no acute distress, oriented  Neuro: Alert, oriented, speech and mentation normal  Psych: affect and mood normal      PSA PSA Tumor Marker   Latest Ref Rng 0.00 - 4.00 ug/L 0.00 - 2.50 ng/mL   6/11/2021 3.13     11/29/2021 3.04     2/2/2022 4.36 (H)     4/24/2023 4.53 (H)     9/1/2023  4.09 (H)    3/11/2024  3.48 (H)    2/28/2025  3.93 (H)       Testosterone Total Sex Hormone Binding Globulin Free Testosterone Calculated   Latest Ref Rng 240 - 950 ng/dL 11 - 80 nmol/L ng/dL   6/11/2021 208 (L)  12  6.24    6/22/2021 187 (L)  11  5.73    11/29/2021 125 (L)  11  3.77    2/2/2022 500  11  16.43    4/24/2023 279  12  8.54         MRI PROSTATE 6/1/2023:  FINDINGS:  Size: 49 grams  Hemorrhage: Absent  Peripheral zone: Homogeneously hypointense on T2-weighted images.  Suspicious lesions as detailed below.  Transition zone: Enlarged with BPH changes. Transition zone nodules  which are circumscribed or mostly encapsulated without diffusion  restriction.  PI-RADS 2.  No highly suspicious nodules.     Lesion(s) in rank order of severity (highest score- to lowest score,  then by size)      Lesion 1:  Location: Right mid gland peripheral zone at the 8 o'clock position  relative to the urethra. Series 5 image 59.   Additional prostate regions involved: None  Size: 5 mm  T2 description: No discrete T2 signal is appreciated.  T2 numerical assessment: 2  DWI description: Restricted on high value DWI, mildly hypointense on  ADC map  DWI numerical assessment: 3  DCE assessment: Negative    Prostate margin: Capsular abutment<6 mm  Lesion overall PI-RADS category: 3     Neurovascular bundles: No neurovascular bundle involvement by  malignancy.  Seminal vesicles: No seminal vesicle involvement by malignancy.  Lymph nodes: No lymph node involvement  Bones: No suspicious lesions  Other pelvic  organs: Sigmoid diverticulosis. Atrophic appearance of  testicles. Small fat-containing left inguinal hernia.                                                                   IMPRESSION:  1. Based on the most suspicious abnormality, this exam is  characterized as PIRADS 3 - The presence of clinically significant  cancer is equivocal.  The most suspicious abnormality is located at  the right mid gland peripheral zone and there is minimal capsular  abutment with no convincing evidence of extraprostatic extension. Of  note, diffuse T2 hypointense appearance of peripheral zone potentially  may indicate prostatitis.  2. No suspicious adenopathy or evidence of pelvic metastases.      ASSESSMENT and PLAN  44-year-old man with history of symptomatic hypogonadism, prior elevated PSA with family history of prostate cancer    Elevated PSA with family history of prostate cancer  -We reviewed the use of PSA as a screening test for prostate cancer  - Again reviewed his PSA history and trend  - His PSA has been stable at 3.93 this year  - Again reviewed his prior MRI from June 2023 with no PI-RADS 4 or 5 lesions and a prostate size of 49 g  - Will plan for continued annual PSA monitoring    Hypogonadism  -I reviewed his labs which are notable for a testosterone in the normal range  - We will plan for continued supplementation at this time  - Follow-up in 1 year with repeat labs    Erectile dysfunction  -Refill prescription for tadalafil sent to the pharmacy        Time spent: 12 minutes spent on the date of the encounter doing chart review, history and exam, documentation and further activities as noted above.    Note copy attestation: the elements have been reviewed, edited as needed, and remain pertinent to today's visit.     Kings Carrillo MD   Urology  [facility]  [facility] Phone: [phone]  [facility] Phone: [phone]      Virtual Visit  Details    Type of service:  Video Visit     Originating Location (pt. Location): Home / Car    Distant Location (provider location):  On-site  Platform used for Video Visit: Ramirez

## 2025-03-04 NOTE — NURSING NOTE
Current patient location: [address] [place] [address]    Is the patient currently in the state of [place]? YES    Visit mode: VIDEO    If the visit is dropped, the patient can be reconnected by:VIDEO VISIT: Text to cell phone:   Telephone Information:   Mobile [phone]       Will anyone else be joining the visit? NO  (If patient encounters technical issues they should call [phone])    Are changes needed to the allergy or medication list? No and Pt stated no med changes    Are refills needed on medications prescribed by this physician? NO    Rooming Documentation:  Not applicable    Reason for visit: ZAKIA COVINGTON

## 2025-04-27 DIAGNOSIS — E29.1 HYPOGONADISM MALE: ICD-10-CM

## 2025-04-29 ENCOUNTER — TELEPHONE (OUTPATIENT)
Dept: UROLOGY | Facility: CLINIC | Age: 45
End: 2025-04-29
Payer: COMMERCIAL

## 2025-04-29 RX ORDER — NEEDLES, DISPOSABLE 25GX5/8"
NEEDLE, DISPOSABLE MISCELLANEOUS
Qty: 50 EACH | Refills: 1 | Status: SHIPPED | OUTPATIENT
Start: 2025-04-29

## 2025-04-29 NOTE — TELEPHONE ENCOUNTER
PA Initiation    Medication: TESTOSTERONE CYPIONATE 200 MG/ML IM SOLN  Insurance Company: [employer] - Phone [phone] Fax [phone]  Pharmacy Filling the Rx: [facility] #[id number]  [place] - [address]  Filling Pharmacy Phone: [phone]  Filling Pharmacy Fax: [phone]  Start Date: 4/29/2025

## 2025-04-29 NOTE — TELEPHONE ENCOUNTER
Prior Authorization Specialty Medication Request    Medication/Dose: testosterone cypionate (DEPOTESTOSTERONE) 200 MG/ML injection   Diagnosis and ICD code (if different than what is on RX):    New/renewal/insurance change PA/secondary ins. PA:  Previously Tried and Failed:      Important Lab Values:   Rationale:    Insurance   Primary:   Insurance ID:      Secondary (if applicable):  Insurance ID:      Pharmacy Information (if different than what is on RX)  Name:    Phone:    Fax:    Clinic Information  Preferred routing pool for dept communication: [facility] [place] [facility]

## 2025-04-29 NOTE — TELEPHONE ENCOUNTER
"Last Written Prescription:  needle, disp, (BD HYPODERMIC NEEDLE) 18G X 1\" MISC             Summary: USE ONCE A WEEK FOR DRAWING UP MEDICATION. Please keep 5-9-23 clinic appt for refills., Disp-50 each, R-11, E-Prescribe  Start: 03/12/2024Ord/Sold: 03/12/2024 (O)Ordered On: 03/12/2024Pharmacy: [facility] #[id number]  [place], [place] - [address] Associated: Taking: Long-term: Med Note:              Patient Sig: USE ONCE A WEEK FOR DRAWING UP MEDICATION. Please keep 5-9-23 clinic appt for refills.  Ordering Department:  UROLOGY  Authorized By: Kings Carrillo MD  Dispense: 50 each  Refills: 11 ordered       ----------------------  Last Visit Date: 3/4/25 Gerardo   Future Visit Date: None  ----------------------      Refill decision: Medication refilled per  Medication Refill in Ambulatory Care  policy.   []  If no future appointment scheduled: Route to Clinic Coordinators to contact the pt for appointment.          Demetra PEPPER RN  [facility] Triage & Med Refill Team    Request from pharmacy:  Requested Prescriptions   Pending Prescriptions Disp Refills    needle (disp) (BD HYPODERMIC NEEDLE) 18G X 1\" MISC [Pharmacy Med Name: [facility] #[id number] NEEDLES 18GX1]       Sig: USE ONCE A WEEK FOR DRAWING UP MEDICATION.       There is no refill protocol information for this order        "

## 2025-04-29 NOTE — TELEPHONE ENCOUNTER
[facility] Call Center    Phone Message    May a detailed message be left on voicemail: no     Reason for Call: Medication Question or concern regarding medication   Prescription Clarification  Name of Medication: testosterone  Prescribing Provider: Gerardo   Pharmacy: To   What on the order needs clarification? Pharmacy needs to get auth to change needle size for patient injectable Medication      Action Taken: Other: [facility]    Travel Screening: Not Applicable     Date of Service:

## 2025-04-30 NOTE — TELEPHONE ENCOUNTER
Prior Authorization Approval    Medication: TESTOSTERONE CYPIONATE 200 MG/ML IM SOLN  Authorization Effective Date: 3/30/2025  Authorization Expiration Date: 4/29/2026  Approved Dose/Quantity:   Reference #:     Insurance Company: [employer] - Phone [phone] Fax [phone]  Expected CoPay: $    CoPay Card Available:      Financial Assistance Needed:   Which Pharmacy is filling the prescription: [facility] #[id number] [facility] [address]  Pharmacy Notified: YES  Patient Notified: **Instructed pharmacy to notify patient when script is ready to /ship.**

## 2025-04-30 NOTE — TELEPHONE ENCOUNTER
"Called and spoke with pharmacist at [facility] in [place]. Per pharmacist, they are unable to get the 18G x 1\" needle and are needing verbal authorization to instead fill 18G x 1.5\" needles. Verbal authorization provided for pharmacy to fill 18G x 1.5\" needles as patient uses this 18G needle for drawing up the medication. Pharmacy verbalized understanding and will begin filling this for patient.    Bess Simmons RN, BSN      "